# Patient Record
Sex: MALE | Race: WHITE | NOT HISPANIC OR LATINO | Employment: UNEMPLOYED | URBAN - METROPOLITAN AREA
[De-identification: names, ages, dates, MRNs, and addresses within clinical notes are randomized per-mention and may not be internally consistent; named-entity substitution may affect disease eponyms.]

---

## 2017-08-18 ENCOUNTER — HOSPITAL ENCOUNTER (EMERGENCY)
Facility: MEDICAL CENTER | Age: 57
End: 2017-08-18
Attending: EMERGENCY MEDICINE
Payer: MEDICAID

## 2017-08-18 VITALS
OXYGEN SATURATION: 100 % | HEART RATE: 99 BPM | TEMPERATURE: 98.1 F | DIASTOLIC BLOOD PRESSURE: 102 MMHG | SYSTOLIC BLOOD PRESSURE: 144 MMHG | BODY MASS INDEX: 36.08 KG/M2 | RESPIRATION RATE: 20 BRPM | WEIGHT: 272.27 LBS | HEIGHT: 73 IN

## 2017-08-18 DIAGNOSIS — Z72.89 DELIBERATE SELF-CUTTING: ICD-10-CM

## 2017-08-18 DIAGNOSIS — S61.512A WRIST LACERATION, LEFT, INITIAL ENCOUNTER: ICD-10-CM

## 2017-08-18 DIAGNOSIS — R45.851 SUICIDAL IDEATION: ICD-10-CM

## 2017-08-18 DIAGNOSIS — S61.511A LACERATION OF WRIST, RIGHT, INITIAL ENCOUNTER: ICD-10-CM

## 2017-08-18 PROCEDURE — 303747 HCHG EXTRA SUTURE

## 2017-08-18 PROCEDURE — 99284 EMERGENCY DEPT VISIT MOD MDM: CPT

## 2017-08-18 PROCEDURE — 304217 HCHG IRRIGATION SYSTEM

## 2017-08-18 PROCEDURE — 304999 HCHG REPAIR-SIMPLE/INTERMED LEVEL 1

## 2017-08-18 PROCEDURE — 700101 HCHG RX REV CODE 250: Performed by: EMERGENCY MEDICINE

## 2017-08-18 RX ORDER — LIDOCAINE HYDROCHLORIDE 10 MG/ML
20 INJECTION, SOLUTION INFILTRATION; PERINEURAL ONCE
Status: COMPLETED | OUTPATIENT
Start: 2017-08-18 | End: 2017-08-18

## 2017-08-18 RX ADMIN — LIDOCAINE HYDROCHLORIDE 20 ML: 10 INJECTION, SOLUTION INFILTRATION; PERINEURAL at 17:41

## 2017-08-18 NOTE — ED AVS SNAPSHOT
8/18/2017    Herb Angulo  [address] [facility] [address]    Dear Herb:    [facility] wants to ensure your discharge home is safe and you or your loved ones have had all of your questions answered regarding your care after you leave the hospital.    Below is a list of resources and contact information should you have any questions regarding your hospital stay, follow-up instructions, or active medical symptoms.    Questions or Concerns Regarding… Contact   Medical Questions Related to Your Discharge  (7 days a week, 8am-5pm) Contact a Nurse Care Coordinator   [phone]   Medical Questions Not Related to Your Discharge  (24 hours a day / 7 days a week)  Contact the Nurse Health Line   [phone]    Medications or Discharge Instructions Refer to your discharge packet   or contact your [facility] Primary Care Provider   [phone]   Follow-up Appointment(s) Schedule your appointment via [employer] InSite   or contact Scheduling [phone]   Billing Review your statement via [employer] InSite  or contact Billing [phone]   Medical Records Review your records via [employer] InSite   or contact Medical Records [phone]     You may receive a telephone call within two days of discharge. This call is to make certain you understand your discharge instructions and have the opportunity to have any questions answered. You can also easily access your medical information, test results and upcoming appointments via the [employer] InSite free online health management tool. You can learn more and sign up at [email or web address]. For assistance setting up your [employer] In[employer] account, please call [phone].    Once again, we want to ensure your discharge home is safe and that you have a clear understanding of any next steps in your care. If you have any questions or concerns, please do not hesitate to contact us, we are here for you. Thank you for choosing [facility] for your healthcare needs.    Sincerely,    Your [facility] Healthcare Team

## 2017-08-18 NOTE — ED NOTES
Herb Angulo  57 y.o.  Chief Complaint   Patient presents with   • Suicidal Ideation     pt took meth and cut bilat wrists and his neck last night. His neck is a superficial wound, the lacerations to bilat wrists appear suturable.   • T-5000 Lacerations     No active bleeding from wrist wounds, BIB family. Pt with [facility] at this time. Charge RN aware of need for medical bed for lacerations. Also aware of need to be placed on legal hold. Pt is aware of what that is and agreeable. PT denies alcohol. States his drugs of choice are meth and pot

## 2017-08-18 NOTE — ED AVS SNAPSHOT
Home Care Instructions                                                                                                                Herb Angulo   MRN: [id number]    Department:  [facility], Emergency Dept   Date of Visit:  8/18/2017            [facility], Emergency Dept    [address] [place] [id number]    Phone:  [phone]      You were seen by     Jose King M.D.      Your Diagnosis Was     Suicidal ideation     R45.851       Follow-up Information     1. Follow up with [facility], Emergency Dept In 10 days.    Specialty:  Emergency Medicine    Why:  For suture removal, For wound re-check    Contact information    [address] [place] [id number] [phone]        2. Follow up with Pcp Pt States None.    Specialty:  Family Medicine      Medication Information     Review all of your home medications and newly ordered medications with your primary doctor and/or pharmacist as soon as possible. Follow medication instructions as directed by your doctor and/or pharmacist.     Please keep your complete medication list with you and share with your physician. Update the information when medications are discontinued, doses are changed, or new medications (including over-the-counter products) are added; and carry medication information at all times in the event of emergency situations.               Medication List      ASK your doctor about these medications        Instructions    Morning Afternoon Evening Bedtime    hydrocodone-acetaminophen 5-325 MG Tabs per tablet   Commonly known as:  NORCO        Take 1 Tab by mouth every four hours as needed.   Dose:  1 Tab                        ondansetron 4 MG Tabs tablet   Commonly known as:  ZOFRAN        Take 1 Tab by mouth every 8 hours as needed for Nausea/Vomiting.   Dose:  4 mg                        oxycodone-acetaminophen 5-325 MG Tabs   Commonly known as:  PERCOCET        Take 1-2  Tabs by mouth every four hours as needed.   Dose:  1-2 Tab                                Procedures and tests performed during your visit     WOUND IRRIGATION        Discharge Instructions       Laceration Care, Adult  A laceration is a cut that goes through all of the layers of the skin and into the tissue that is right under the skin. Some lacerations heal on their own. Others need to be closed with stitches (sutures), staples, skin adhesive strips, or skin glue. Proper laceration care minimizes the risk of infection and helps the laceration to heal better.  HOW TO CARE FOR YOUR LACERATION  If sutures or staples were used:  · Keep the wound clean and dry.  · If you were given a bandage (dressing), you should change it at least one time per day or as told by your health care provider. You should also change it if it becomes wet or dirty.  · Keep the wound completely dry for the first 24 hours or as told by your health care provider. After that time, you may shower or bathe. However, make sure that the wound is not soaked in water until after the sutures or staples have been removed.  · Clean the wound one time each day or as told by your health care provider:  ¨ Wash the wound with soap and water.  ¨ Rinse the wound with water to remove all soap.  ¨ Pat the wound dry with a clean towel. Do not rub the wound.  · After cleaning the wound, apply a thin layer of antibiotic ointment as told by your health care provider. This will help to prevent infection and keep the dressing from sticking to the wound.  · Have the sutures or staples removed as told by your health care provider.  If skin adhesive strips were used:  · Keep the wound clean and dry.  · If you were given a bandage (dressing), you should change it at least one time per day or as told by your health care provider. You should also change it if it becomes dirty or wet.  · Do not get the skin adhesive strips wet. You may shower or bathe, but be careful to keep  the wound dry.  · If the wound gets wet, pat it dry with a clean towel. Do not rub the wound.  · Skin adhesive strips fall off on their own. You may trim the strips as the wound heals. Do not remove skin adhesive strips that are still stuck to the wound. They will fall off in time.  If skin glue was used:  · Try to keep the wound dry, but you may briefly wet it in the shower or bath. Do not soak the wound in water, such as by swimming.  · After you have showered or bathed, gently pat the wound dry with a clean towel. Do not rub the wound.  · Do not do any activities that will make you sweat heavily until the skin glue has fallen off on its own.  · Do not apply liquid, cream, or ointment medicine to the wound while the skin glue is in place. Using those may loosen the film before the wound has healed.  · If you were given a bandage (dressing), you should change it at least one time per day or as told by your health care provider. You should also change it if it becomes dirty or wet.  · If a dressing is placed over the wound, be careful not to apply tape directly over the skin glue. Doing that may cause the glue to be pulled off before the wound has healed.  · Do not pick at the glue. The skin glue usually remains in place for 5-10 days, then it falls off of the skin.  General Instructions  · Take over-the-counter and prescription medicines only as told by your health care provider.  · If you were prescribed an antibiotic medicine or ointment, take or apply it as told by your doctor. Do not stop using it even if your condition improves.  · To help prevent scarring, make sure to cover your wound with sunscreen whenever you are outside after stitches are removed, after adhesive strips are removed, or when glue remains in place and the wound is healed. Make sure to wear a sunscreen of at least 30 SPF.  · Do not scratch or pick at the wound.  · Keep all follow-up visits as told by your health care provider. This is  important.  · Check your wound every day for signs of infection. Watch for:  ¨ Redness, swelling, or pain.  ¨ Fluid, blood, or pus.  · Raise (elevate) the injured area above the level of your heart while you are sitting or lying down, if possible.  SEEK MEDICAL CARE IF:  · You received a tetanus shot and you have swelling, severe pain, redness, or bleeding at the injection site.  · You have a fever.  · A wound that was closed breaks open.  · You notice a bad smell coming from your wound or your dressing.  · You notice something coming out of the wound, such as wood or glass.  · Your pain is not controlled with medicine.  · You have increased redness, swelling, or pain at the site of your wound.  · You have fluid, blood, or pus coming from your wound.  · You notice a change in the color of your skin near your wound.  · You need to change the dressing frequently due to fluid, blood, or pus draining from the wound.  · You develop a new rash.  · You develop numbness around the wound.  SEEK IMMEDIATE MEDICAL CARE IF:  · You develop severe swelling around the wound.  · Your pain suddenly increases and is severe.  · You develop painful lumps near the wound or on skin that is anywhere on your body.  · You have a red streak going away from your wound.  · The wound is on your hand or foot and you cannot properly move a finger or toe.  · The wound is on your hand or foot and you notice that your fingers or toes look pale or bluish.     This information is not intended to replace advice given to you by your health care provider. Make sure you discuss any questions you have with your health care provider.     Document Released: 12/18/2006 Document Revised: 05/03/2016 Document Reviewed: 12/14/2015  [employer] Interactive Patient Education ©2016 [employer]            Patient Information     Patient Information    Following emergency treatment: all patient requiring follow-up care must return either to a private physician or a  clinic if your condition worsens before you are able to obtain further medical attention, please return to the emergency room.     Billing Information    At [facility], we work to make the billing process streamlined for our patients.  Our Representatives are here to answer any questions you may have regarding your hospital bill.  If you have insurance coverage and have supplied your insurance information to us, we will submit a claim to your insurer on your behalf.  Should you have any questions regarding your bill, we can be reached online or by phone as follows:  Online: You are able pay your bills online or live chat with our representatives about any billing questions you may have. We are here to help Monday - Friday from 8:00am to 7:30pm and 9:00am - 12:00pm on Saturdays.  Please visit [email or web address]  for more information.   Phone:  [phone] or [phone]    Please note that your emergency physician, surgeon, pathologist, radiologist, anesthesiologist, and other specialists are not employed by [facility] and will therefore bill separately for their services.  Please contact them directly for any questions concerning their bills at the numbers below:     Emergency Physician Services:  [phone]  [facility]:  [phone]  [facility]:  [phone]  [facility]:  [phone]    1. Your final bill may vary from the amount quoted upon discharge if all procedures are not complete at that time, or if your doctor has additional procedures of which we are not aware. You will receive an additional bill if you return to the Emergency Department at [facility] for suture removal regardless of the facility of which the sutures were placed.     2. Please arrange for settlement of this account at the emergency registration.    3. All self-pay accounts are due in full at the time of treatment.  If you are unable to meet  this obligation then payment is expected within 4-5 days.     4. If you have had radiology studies (CT, X-ray, Ultrasound, MRI), you have received a preliminary result during your emergency department visit. Please contact the radiology department [phone] to receive a copy of your final result. Please discuss the Final result with your primary physician or with the follow up physician provided.     Crisis Hotline:  [facility] Crisis Hotline:  4-064-YCZDRXB or 1-238.250.4299  [place] Crisis Hotline:    1-313.222.5127 or 203-794-6490         ED Discharge Follow Up Questions    1. In order to provide you with very good care, we would like to follow up with a phone call in the next few days.  May we have your permission to contact you?     YES /  NO    2. What is the best phone number to call you? (       )_____-__________    3. What is the best time to call you?      Morning  /  Afternoon  /  Evening                   Patient Signature:  ____________________________________________________________    Date:  ____________________________________________________________

## 2017-08-18 NOTE — ED NOTES
Dr King has evaluated patient.  [facility] assessment complete.  Attempting to find room with stacey for treatment.

## 2017-08-18 NOTE — ED PROVIDER NOTES
"CHIEF COMPLAINT  Chief Complaint   Patient presents with   • Suicidal Ideation     pt took meth and cut bilat wrists and his neck last night. His neck is a superficial wound, the lacerations to bilat wrists appear suturable.   • T-5000 Lacerations       HPI  Herb Angulo is a 57 y.o. male who presents  After episode of self cutting that occurred yesterday. He cut bilateral wrists with a serrated knife. He regrets  Doing this. He denies active suicidal ideation. He reports that he did this after taking methamphetamines. Has a prior history of substance abuse. He was brought in by family members.    No active bleeding. Normal perfusion to his hands. He denies any irritation in his range of motion of his wrist, hand. Denies any weakness to his  strength. Denies any numbness to his hands. He believes he is up-to-date with his tetanus.    REVIEW OF SYSTEMS  See HPI for further details. All other systems are negative.     PAST MEDICAL HISTORY   has a past medical history of Psychiatric disorder.    SOCIAL HISTORY  Social History     Social History Main Topics   • Smoking status: Never Smoker    • Smokeless tobacco: Not on file   • Alcohol Use: No   • Drug Use: Yes     Special: Inhaled      Comment: daily marijuana use and meth   • Sexual Activity: Not on file       SURGICAL HISTORY   has past surgical history that includes other orthopedic surgery.    CURRENT MEDICATIONS  Home Medications     **Home medications have not yet been reviewed for this encounter**          ALLERGIES  No Known Allergies    PHYSICAL EXAM  VITAL SIGNS: /102 mmHg  Pulse 105  Temp(Src) 36.7 °C (98.1 °F) (Temporal)  Resp 18  Ht 1.854 m (6' 0.99\")  Wt 123.5 kg (272 lb 4.3 oz)  BMI 35.93 kg/m2  SpO2 97%  Pulse ox interpretation: I interpret this pulse ox as normal.  Constitutional: Alert in no apparent distress.  HENT: No signs of trauma, Bilateral external ears normal, Nose normal.   Eyes: Pupils are equal and reactive, " Conjunctiva normal, Non-icteric.   Neck: Normal range of motion, No tenderness, Supple, No stridor.   Lymphatic: No lymphadenopathy noted.   Cardiovascular: Regular rate and rhythm, no murmurs.   Thorax & Lungs: Normal breath sounds, No respiratory distress, No wheezing, No chest tenderness.   Abdomen: Bowel sounds normal, Soft, No tenderness, No masses, No pulsatile masses. No peritoneal signs.  Skin: Warm, Dry, No erythema, No rash.  Approximately 4 cm laceration to his right wrist. There appeared to be approximately 3 of them that are parallel. Approximately 4 cm laceration to the left wrist.  Back: No bony tenderness, No CVA tenderness.   Extremities: Intact distal pulses, No edema, No tenderness, No cyanosis, Brisk capillary refill in all fingers.  Musculoskeletal: Good range of motion in all major joints. No tenderness to palpation or major deformities noted.  The patient is able to move all fingers and wrists in all directions with 5 out of 5 strength. Each finger was tested individually regarding strength on flexion and there is no limitation. Normal flexion and extension at the wrist as well. Normal palpable radial and ulnar pulses at the wrists.  Neurologic: Alert , Normal motor function and gait, Normal sensory function, No focal deficits noted.   Psychiatric:  Denies active suicidal or homicidal ideation       DIAGNOSTIC STUDIES / PROCEDURES    Laceration Repair Procedure Note    Indication: Laceration    Procedure: The patient was placed in the appropriate position and anesthesia around the laceration was obtained by infiltration using 1% Lidocaine without epinephrine. The area was then irrigated with normal saline. The laceration was closed with 4-0 Ethilon using interrupted sutures. A second and third laceration was closed with 4-0 Ethilon using interrupted sutures. The wound area was then dressed with a sterile dressing.      Total repaired wound length: 4 cm, 4 cm, 4 cm     Other Items: Suture  "count: 9    The patient tolerated the procedure well.    Complications: None      COURSE & MEDICAL DECISION MAKING  Pertinent Labs & Imaging studies reviewed. (See chart for details)  57 y.o. M, right hand dominant, presenting after cutting bilateral wrists with a serrated knife after taking methamphetamines. He appears to be remorseful and denies active suicidal or homicidal ideation. He is brought in by concerned family members. Lacerations were cleaned and irrigated. There were then sutured loosely. Risks and benefits of repair were discussed with the patient. Given that it has been a day since the injury occurred, there is increased risk for infection however given the gaping nature of the wound, loose approximation was performed.    No obvious foreign bodies were identified. Clinically, the patient did not have evidence of tendon injury as he has full range of motion to his wrist with normal strength in bilateral hands.    Wounds were then dressed and he was sent to psychiatric facility in the care of his family member. He was volume by [facility] and they believe he is safe to discharge out to this facility directly. The patient is agreeable with the plan. Tolerated the procedure well without any complications.    The patient will return for worsening symptoms or failure of improvement and is stable at the time of discharge. The patient verbalizes understanding in their own words.    /102 mmHg  Pulse 99  Temp(Src) 36.7 °C (98.1 °F) (Temporal)  Resp 20  Ht 1.854 m (6' 0.99\")  Wt 123.5 kg (272 lb 4.3 oz)  BMI 35.93 kg/m2  SpO2 100%    The patient was referred to primary care where they will receive further BP management.      [facility], Emergency Dept  [address] [place] [id number] [phone]  In 10 days  For suture removal, For wound re-check    Pcp Pt States None            FINAL IMPRESSION  1. Suicidal ideation    2. Deliberate self-cutting    3. Wrist " laceration, left, initial encounter    4. Laceration of wrist, right, initial encounter            Electronically signed by: Jose King, 8/18/2017 4:35 PM

## 2017-08-18 NOTE — ED AVS SNAPSHOT
FairShare Access Code: [email or web address]  Expires: 9/17/2017  7:04 PM    Your email address is not on file at [facility].  Email Addresses are required for you to sign up for FairShare, please contact [phone] to verify your personal information and to provide your email address prior to attempting to register for FairShare.    Herb Angulo  [address] [place] [address], [place] [address]    FairShare  A secure, online tool to manage your health information     [facility]’s FairShare® is a secure, online tool that connects you to your personalized health information from the privacy of your home -- day or night - making it very easy for you to manage your healthcare. Once the activation process is completed, you can even access your medical information using the FairShare rosangela, which is available for free in the Apple Rosangela store or Google Play store.     To learn more about FairShare, visit [email or web address]    There are two levels of access available (as shown below):   My Chart Features  [facility] Primary Care Doctor [facility]  Specialists [facility]  Urgent  Care Non-[facility] Primary Care Doctor   Email your healthcare team securely and privately 24/7 X X X    Manage appointments: schedule your next appointment; view details of past/upcoming appointments X      Request prescription refills. X      View recent personal medical records, including lab and immunizations X X X X   View health record, including health history, allergies, medications X X X X   Read reports about your outpatient visits, procedures, consult and ER notes X X X X   See your discharge summary, which is a recap of your hospital and/or ER visit that includes your diagnosis, lab results, and care plan X X  X     How to register for [employer]:  Once your e-mail address has been verified, follow the following steps to sign up for [employer].     1. Go to  [email or web address]  2. Click on the Sign Up Now box, which takes you to the New Member Sign Up  page. You will need to provide the following information:  a. Enter your [facility] Access Code exactly as it appears at the top of this page. (You will not need to use this code after you’ve completed the sign-up process. If you do not sign up before the expiration date, you must request a new code.)   b. Enter your date of birth.   c. Enter your home email address.   d. Click Submit, and follow the next screen’s instructions.  3. Create a [facility] ID. This will be your [facility] login ID and cannot be changed, so think of one that is secure and easy to remember.  4. Create a [facility] password. You can change your password at any time.  5. Enter your Password Reset Question and Answer. This can be used at a later time if you forget your password.   6. Enter your e-mail address. This allows you to receive e-mail notifications when new information is available in [facility].  7. Click Sign Up. You can now view your health information.    For assistance activating your [facility] account, call [phone]

## 2017-08-19 NOTE — DISCHARGE INSTRUCTIONS
Laceration Care, Adult  A laceration is a cut that goes through all of the layers of the skin and into the tissue that is right under the skin. Some lacerations heal on their own. Others need to be closed with stitches (sutures), staples, skin adhesive strips, or skin glue. Proper laceration care minimizes the risk of infection and helps the laceration to heal better.  HOW TO CARE FOR YOUR LACERATION  If sutures or staples were used:  · Keep the wound clean and dry.  · If you were given a bandage (dressing), you should change it at least one time per day or as told by your health care provider. You should also change it if it becomes wet or dirty.  · Keep the wound completely dry for the first 24 hours or as told by your health care provider. After that time, you may shower or bathe. However, make sure that the wound is not soaked in water until after the sutures or staples have been removed.  · Clean the wound one time each day or as told by your health care provider:  ¨ Wash the wound with soap and water.  ¨ Rinse the wound with water to remove all soap.  ¨ Pat the wound dry with a clean towel. Do not rub the wound.  · After cleaning the wound, apply a thin layer of antibiotic ointment as told by your health care provider. This will help to prevent infection and keep the dressing from sticking to the wound.  · Have the sutures or staples removed as told by your health care provider.  If skin adhesive strips were used:  · Keep the wound clean and dry.  · If you were given a bandage (dressing), you should change it at least one time per day or as told by your health care provider. You should also change it if it becomes dirty or wet.  · Do not get the skin adhesive strips wet. You may shower or bathe, but be careful to keep the wound dry.  · If the wound gets wet, pat it dry with a clean towel. Do not rub the wound.  · Skin adhesive strips fall off on their own. You may trim the strips as the wound heals. Do not  remove skin adhesive strips that are still stuck to the wound. They will fall off in time.  If skin glue was used:  · Try to keep the wound dry, but you may briefly wet it in the shower or bath. Do not soak the wound in water, such as by swimming.  · After you have showered or bathed, gently pat the wound dry with a clean towel. Do not rub the wound.  · Do not do any activities that will make you sweat heavily until the skin glue has fallen off on its own.  · Do not apply liquid, cream, or ointment medicine to the wound while the skin glue is in place. Using those may loosen the film before the wound has healed.  · If you were given a bandage (dressing), you should change it at least one time per day or as told by your health care provider. You should also change it if it becomes dirty or wet.  · If a dressing is placed over the wound, be careful not to apply tape directly over the skin glue. Doing that may cause the glue to be pulled off before the wound has healed.  · Do not pick at the glue. The skin glue usually remains in place for 5-10 days, then it falls off of the skin.  General Instructions  · Take over-the-counter and prescription medicines only as told by your health care provider.  · If you were prescribed an antibiotic medicine or ointment, take or apply it as told by your doctor. Do not stop using it even if your condition improves.  · To help prevent scarring, make sure to cover your wound with sunscreen whenever you are outside after stitches are removed, after adhesive strips are removed, or when glue remains in place and the wound is healed. Make sure to wear a sunscreen of at least 30 SPF.  · Do not scratch or pick at the wound.  · Keep all follow-up visits as told by your health care provider. This is important.  · Check your wound every day for signs of infection. Watch for:  ¨ Redness, swelling, or pain.  ¨ Fluid, blood, or pus.  · Raise (elevate) the injured area above the level of your heart  while you are sitting or lying down, if possible.  SEEK MEDICAL CARE IF:  · You received a tetanus shot and you have swelling, severe pain, redness, or bleeding at the injection site.  · You have a fever.  · A wound that was closed breaks open.  · You notice a bad smell coming from your wound or your dressing.  · You notice something coming out of the wound, such as wood or glass.  · Your pain is not controlled with medicine.  · You have increased redness, swelling, or pain at the site of your wound.  · You have fluid, blood, or pus coming from your wound.  · You notice a change in the color of your skin near your wound.  · You need to change the dressing frequently due to fluid, blood, or pus draining from the wound.  · You develop a new rash.  · You develop numbness around the wound.  SEEK IMMEDIATE MEDICAL CARE IF:  · You develop severe swelling around the wound.  · Your pain suddenly increases and is severe.  · You develop painful lumps near the wound or on skin that is anywhere on your body.  · You have a red streak going away from your wound.  · The wound is on your hand or foot and you cannot properly move a finger or toe.  · The wound is on your hand or foot and you notice that your fingers or toes look pale or bluish.     This information is not intended to replace advice given to you by your health care provider. Make sure you discuss any questions you have with your health care provider.     Document Released: 12/18/2006 Document Revised: 05/03/2016 Document Reviewed: 12/14/2015  [employer] Patient Education ©2016 [employer]
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
## 2017-08-19 NOTE — ED NOTES
Discharge instructions given to patient; patient verbalized understanding. VS WNL for baseline. Pt left ED w/ steady gait headed to [facility].

## 2017-08-26 ENCOUNTER — HOSPITAL ENCOUNTER (INPATIENT)
Facility: MEDICAL CENTER | Age: 57
LOS: 2 days | DRG: 603 | End: 2017-08-29
Attending: EMERGENCY MEDICINE | Admitting: INTERNAL MEDICINE
Payer: MEDICAID

## 2017-08-26 DIAGNOSIS — L02.414 ABSCESS OF FOREARM, LEFT: ICD-10-CM

## 2017-08-26 LAB
ALBUMIN SERPL BCP-MCNC: 3.9 G/DL (ref 3.2–4.9)
ALBUMIN/GLOB SERPL: 1.3 G/DL
ALP SERPL-CCNC: 76 U/L (ref 30–99)
ALT SERPL-CCNC: 16 U/L (ref 2–50)
ANION GAP SERPL CALC-SCNC: 10 MMOL/L (ref 0–11.9)
AST SERPL-CCNC: 16 U/L (ref 12–45)
BASOPHILS # BLD AUTO: 0.6 % (ref 0–1.8)
BASOPHILS # BLD: 0.04 K/UL (ref 0–0.12)
BILIRUB SERPL-MCNC: 0.4 MG/DL (ref 0.1–1.5)
BUN SERPL-MCNC: 11 MG/DL (ref 8–22)
CALCIUM SERPL-MCNC: 9 MG/DL (ref 8.5–10.5)
CHLORIDE SERPL-SCNC: 107 MMOL/L (ref 96–112)
CO2 SERPL-SCNC: 22 MMOL/L (ref 20–33)
CREAT SERPL-MCNC: 1.15 MG/DL (ref 0.5–1.4)
EOSINOPHIL # BLD AUTO: 0.12 K/UL (ref 0–0.51)
EOSINOPHIL NFR BLD: 1.7 % (ref 0–6.9)
ERYTHROCYTE [DISTWIDTH] IN BLOOD BY AUTOMATED COUNT: 39.6 FL (ref 35.9–50)
GFR SERPL CREATININE-BSD FRML MDRD: >60 ML/MIN/1.73 M 2
GLOBULIN SER CALC-MCNC: 3 G/DL (ref 1.9–3.5)
GLUCOSE SERPL-MCNC: 118 MG/DL (ref 65–99)
HCT VFR BLD AUTO: 40.5 % (ref 42–52)
HGB BLD-MCNC: 13.7 G/DL (ref 14–18)
IMM GRANULOCYTES # BLD AUTO: 0.02 K/UL (ref 0–0.11)
IMM GRANULOCYTES NFR BLD AUTO: 0.3 % (ref 0–0.9)
LYMPHOCYTES # BLD AUTO: 1.98 K/UL (ref 1–4.8)
LYMPHOCYTES NFR BLD: 27.7 % (ref 22–41)
MCH RBC QN AUTO: 30.3 PG (ref 27–33)
MCHC RBC AUTO-ENTMCNC: 33.8 G/DL (ref 33.7–35.3)
MCV RBC AUTO: 89.6 FL (ref 81.4–97.8)
MONOCYTES # BLD AUTO: 0.65 K/UL (ref 0–0.85)
MONOCYTES NFR BLD AUTO: 9.1 % (ref 0–13.4)
NEUTROPHILS # BLD AUTO: 4.35 K/UL (ref 1.82–7.42)
NEUTROPHILS NFR BLD: 60.6 % (ref 44–72)
NRBC # BLD AUTO: 0 K/UL
NRBC BLD AUTO-RTO: 0 /100 WBC
PLATELET # BLD AUTO: 290 K/UL (ref 164–446)
PMV BLD AUTO: 9.6 FL (ref 9–12.9)
POTASSIUM SERPL-SCNC: 3.8 MMOL/L (ref 3.6–5.5)
PROT SERPL-MCNC: 6.9 G/DL (ref 6–8.2)
RBC # BLD AUTO: 4.52 M/UL (ref 4.7–6.1)
SODIUM SERPL-SCNC: 139 MMOL/L (ref 135–145)
WBC # BLD AUTO: 7.2 K/UL (ref 4.8–10.8)

## 2017-08-26 PROCEDURE — 94760 N-INVAS EAR/PLS OXIMETRY 1: CPT

## 2017-08-26 PROCEDURE — 87186 SC STD MICRODIL/AGAR DIL: CPT

## 2017-08-26 PROCEDURE — 99285 EMERGENCY DEPT VISIT HI MDM: CPT

## 2017-08-26 PROCEDURE — 87205 SMEAR GRAM STAIN: CPT

## 2017-08-26 PROCEDURE — 80053 COMPREHEN METABOLIC PANEL: CPT

## 2017-08-26 PROCEDURE — 36415 COLL VENOUS BLD VENIPUNCTURE: CPT

## 2017-08-26 PROCEDURE — 96365 THER/PROPH/DIAG IV INF INIT: CPT

## 2017-08-26 PROCEDURE — 87077 CULTURE AEROBIC IDENTIFY: CPT

## 2017-08-26 PROCEDURE — 85025 COMPLETE CBC W/AUTO DIFF WBC: CPT

## 2017-08-26 PROCEDURE — 87070 CULTURE OTHR SPECIMN AEROBIC: CPT

## 2017-08-26 PROCEDURE — 700101 HCHG RX REV CODE 250

## 2017-08-26 RX ORDER — CLINDAMYCIN PHOSPHATE 600 MG/50ML
INJECTION, SOLUTION INTRAVENOUS
Status: COMPLETED
Start: 2017-08-26 | End: 2017-08-26

## 2017-08-26 RX ORDER — CLINDAMYCIN PHOSPHATE 600 MG/50ML
600 INJECTION, SOLUTION INTRAVENOUS ONCE
Status: COMPLETED | OUTPATIENT
Start: 2017-08-26 | End: 2017-08-26

## 2017-08-26 RX ADMIN — CLINDAMYCIN PHOSPHATE 600 MG: 600 INJECTION, SOLUTION INTRAVENOUS at 23:06

## 2017-08-26 RX ADMIN — CLINDAMYCIN IN 5 PERCENT DEXTROSE 600 MG: 12 INJECTION, SOLUTION INTRAVENOUS at 23:06

## 2017-08-27 ENCOUNTER — RESOLUTE PROFESSIONAL BILLING HOSPITAL PROF FEE (OUTPATIENT)
Dept: HOSPITALIST | Facility: MEDICAL CENTER | Age: 57
End: 2017-08-27

## 2017-08-27 ENCOUNTER — APPOINTMENT (OUTPATIENT)
Dept: RADIOLOGY | Facility: MEDICAL CENTER | Age: 57
DRG: 603 | End: 2017-08-27
Attending: INTERNAL MEDICINE
Payer: MEDICAID

## 2017-08-27 PROBLEM — L03.90 CELLULITIS: Status: ACTIVE | Noted: 2017-08-27

## 2017-08-27 PROBLEM — F32.A DEPRESSION: Status: ACTIVE | Noted: 2017-08-27

## 2017-08-27 PROBLEM — L08.9 WOUND INFECTION: Status: ACTIVE | Noted: 2017-08-27

## 2017-08-27 PROBLEM — T14.8XXA WOUND INFECTION: Status: ACTIVE | Noted: 2017-08-27

## 2017-08-27 LAB
ALBUMIN SERPL BCP-MCNC: 3.6 G/DL (ref 3.2–4.9)
ALBUMIN/GLOB SERPL: 1.3 G/DL
ALP SERPL-CCNC: 74 U/L (ref 30–99)
ALT SERPL-CCNC: 15 U/L (ref 2–50)
AMPHET UR QL SCN: NEGATIVE
ANION GAP SERPL CALC-SCNC: 6 MMOL/L (ref 0–11.9)
AST SERPL-CCNC: 14 U/L (ref 12–45)
BARBITURATES UR QL SCN: NEGATIVE
BASOPHILS # BLD AUTO: 0.4 % (ref 0–1.8)
BASOPHILS # BLD: 0.02 K/UL (ref 0–0.12)
BENZODIAZ UR QL SCN: NEGATIVE
BILIRUB SERPL-MCNC: 0.5 MG/DL (ref 0.1–1.5)
BUN SERPL-MCNC: 10 MG/DL (ref 8–22)
BZE UR QL SCN: NEGATIVE
CALCIUM SERPL-MCNC: 8.5 MG/DL (ref 8.5–10.5)
CANNABINOIDS UR QL SCN: POSITIVE
CHLORIDE SERPL-SCNC: 107 MMOL/L (ref 96–112)
CO2 SERPL-SCNC: 24 MMOL/L (ref 20–33)
CREAT SERPL-MCNC: 1.05 MG/DL (ref 0.5–1.4)
EOSINOPHIL # BLD AUTO: 0.04 K/UL (ref 0–0.51)
EOSINOPHIL NFR BLD: 0.7 % (ref 0–6.9)
ERYTHROCYTE [DISTWIDTH] IN BLOOD BY AUTOMATED COUNT: 40.5 FL (ref 35.9–50)
GFR SERPL CREATININE-BSD FRML MDRD: >60 ML/MIN/1.73 M 2
GLOBULIN SER CALC-MCNC: 2.8 G/DL (ref 1.9–3.5)
GLUCOSE SERPL-MCNC: 106 MG/DL (ref 65–99)
GRAM STN SPEC: NORMAL
HCT VFR BLD AUTO: 37.5 % (ref 42–52)
HGB BLD-MCNC: 13 G/DL (ref 14–18)
IMM GRANULOCYTES # BLD AUTO: 0.01 K/UL (ref 0–0.11)
IMM GRANULOCYTES NFR BLD AUTO: 0.2 % (ref 0–0.9)
LYMPHOCYTES # BLD AUTO: 1.2 K/UL (ref 1–4.8)
LYMPHOCYTES NFR BLD: 22.4 % (ref 22–41)
MAGNESIUM SERPL-MCNC: 2.3 MG/DL (ref 1.5–2.5)
MCH RBC QN AUTO: 31.6 PG (ref 27–33)
MCHC RBC AUTO-ENTMCNC: 34.7 G/DL (ref 33.7–35.3)
MCV RBC AUTO: 91.2 FL (ref 81.4–97.8)
METHADONE UR QL SCN: NEGATIVE
MONOCYTES # BLD AUTO: 0.37 K/UL (ref 0–0.85)
MONOCYTES NFR BLD AUTO: 6.9 % (ref 0–13.4)
NEUTROPHILS # BLD AUTO: 3.72 K/UL (ref 1.82–7.42)
NEUTROPHILS NFR BLD: 69.4 % (ref 44–72)
NRBC # BLD AUTO: 0 K/UL
NRBC BLD AUTO-RTO: 0 /100 WBC
OPIATES UR QL SCN: NEGATIVE
OXYCODONE UR QL SCN: NEGATIVE
PCP UR QL SCN: NEGATIVE
PLATELET # BLD AUTO: 230 K/UL (ref 164–446)
PMV BLD AUTO: 9.4 FL (ref 9–12.9)
POTASSIUM SERPL-SCNC: 3.9 MMOL/L (ref 3.6–5.5)
PROPOXYPH UR QL SCN: NEGATIVE
PROT SERPL-MCNC: 6.4 G/DL (ref 6–8.2)
RBC # BLD AUTO: 4.11 M/UL (ref 4.7–6.1)
SIGNIFICANT IND 70042: NORMAL
SITE SITE: NORMAL
SODIUM SERPL-SCNC: 137 MMOL/L (ref 135–145)
SOURCE SOURCE: NORMAL
WBC # BLD AUTO: 5.4 K/UL (ref 4.8–10.8)

## 2017-08-27 PROCEDURE — 700102 HCHG RX REV CODE 250 W/ 637 OVERRIDE(OP): Performed by: STUDENT IN AN ORGANIZED HEALTH CARE EDUCATION/TRAINING PROGRAM

## 2017-08-27 PROCEDURE — 700111 HCHG RX REV CODE 636 W/ 250 OVERRIDE (IP): Performed by: INTERNAL MEDICINE

## 2017-08-27 PROCEDURE — 700105 HCHG RX REV CODE 258: Performed by: STUDENT IN AN ORGANIZED HEALTH CARE EDUCATION/TRAINING PROGRAM

## 2017-08-27 PROCEDURE — 36415 COLL VENOUS BLD VENIPUNCTURE: CPT

## 2017-08-27 PROCEDURE — 99222 1ST HOSP IP/OBS MODERATE 55: CPT | Mod: GC | Performed by: INTERNAL MEDICINE

## 2017-08-27 PROCEDURE — 80053 COMPREHEN METABOLIC PANEL: CPT

## 2017-08-27 PROCEDURE — 76882 US LMTD JT/FCL EVL NVASC XTR: CPT | Mod: LT

## 2017-08-27 PROCEDURE — 83735 ASSAY OF MAGNESIUM: CPT

## 2017-08-27 PROCEDURE — 700101 HCHG RX REV CODE 250: Performed by: STUDENT IN AN ORGANIZED HEALTH CARE EDUCATION/TRAINING PROGRAM

## 2017-08-27 PROCEDURE — 87040 BLOOD CULTURE FOR BACTERIA: CPT | Mod: 91

## 2017-08-27 PROCEDURE — 700111 HCHG RX REV CODE 636 W/ 250 OVERRIDE (IP): Performed by: STUDENT IN AN ORGANIZED HEALTH CARE EDUCATION/TRAINING PROGRAM

## 2017-08-27 PROCEDURE — 3E0234Z INTRODUCTION OF SERUM, TOXOID AND VACCINE INTO MUSCLE, PERCUTANEOUS APPROACH: ICD-10-PCS | Performed by: INTERNAL MEDICINE

## 2017-08-27 PROCEDURE — 90471 IMMUNIZATION ADMIN: CPT

## 2017-08-27 PROCEDURE — 80307 DRUG TEST PRSMV CHEM ANLYZR: CPT

## 2017-08-27 PROCEDURE — A9270 NON-COVERED ITEM OR SERVICE: HCPCS | Performed by: STUDENT IN AN ORGANIZED HEALTH CARE EDUCATION/TRAINING PROGRAM

## 2017-08-27 PROCEDURE — 90715 TDAP VACCINE 7 YRS/> IM: CPT | Performed by: INTERNAL MEDICINE

## 2017-08-27 PROCEDURE — 770006 HCHG ROOM/CARE - MED/SURG/GYN SEMI*

## 2017-08-27 PROCEDURE — 85025 COMPLETE CBC W/AUTO DIFF WBC: CPT

## 2017-08-27 RX ORDER — CLINDAMYCIN PHOSPHATE 600 MG/50ML
600 INJECTION, SOLUTION INTRAVENOUS EVERY 8 HOURS
Status: DISCONTINUED | OUTPATIENT
Start: 2017-08-27 | End: 2017-08-29 | Stop reason: HOSPADM

## 2017-08-27 RX ORDER — AMOXICILLIN 250 MG
2 CAPSULE ORAL 2 TIMES DAILY
Status: DISCONTINUED | OUTPATIENT
Start: 2017-08-27 | End: 2017-08-29 | Stop reason: HOSPADM

## 2017-08-27 RX ORDER — BISACODYL 10 MG
10 SUPPOSITORY, RECTAL RECTAL
Status: DISCONTINUED | OUTPATIENT
Start: 2017-08-27 | End: 2017-08-29 | Stop reason: HOSPADM

## 2017-08-27 RX ORDER — ACETAMINOPHEN 325 MG/1
650 TABLET ORAL EVERY 6 HOURS PRN
Status: DISCONTINUED | OUTPATIENT
Start: 2017-08-27 | End: 2017-08-29 | Stop reason: HOSPADM

## 2017-08-27 RX ORDER — POLYETHYLENE GLYCOL 3350 17 G/17G
1 POWDER, FOR SOLUTION ORAL
Status: DISCONTINUED | OUTPATIENT
Start: 2017-08-27 | End: 2017-08-29 | Stop reason: HOSPADM

## 2017-08-27 RX ORDER — LABETALOL HYDROCHLORIDE 5 MG/ML
10 INJECTION, SOLUTION INTRAVENOUS EVERY 4 HOURS PRN
Status: DISCONTINUED | OUTPATIENT
Start: 2017-08-27 | End: 2017-08-29 | Stop reason: HOSPADM

## 2017-08-27 RX ORDER — SODIUM CHLORIDE 9 MG/ML
INJECTION, SOLUTION INTRAVENOUS CONTINUOUS
Status: DISCONTINUED | OUTPATIENT
Start: 2017-08-27 | End: 2017-08-29 | Stop reason: HOSPADM

## 2017-08-27 RX ADMIN — CLOSTRIDIUM TETANI TOXOID ANTIGEN (FORMALDEHYDE INACTIVATED), CORYNEBACTERIUM DIPHTHERIAE TOXOID ANTIGEN (FORMALDEHYDE INACTIVATED), BORDETELLA PERTUSSIS TOXOID ANTIGEN (GLUTARALDEHYDE INACTIVATED), BORDETELLA PERTUSSIS FILAMENTOUS HEMAGGLUTININ ANTIGEN (FORMALDEHYDE INACTIVATED), BORDETELLA PERTUSSIS PERTACTIN ANTIGEN, AND BORDETELLA PERTUSSIS FIMBRIAE 2/3 ANTIGEN 0.5 ML: 5; 2; 2.5; 5; 3; 5 INJECTION, SUSPENSION INTRAMUSCULAR at 02:31

## 2017-08-27 RX ADMIN — CLINDAMYCIN IN 5 PERCENT DEXTROSE 600 MG: 12 INJECTION, SOLUTION INTRAVENOUS at 13:56

## 2017-08-27 RX ADMIN — CLINDAMYCIN IN 5 PERCENT DEXTROSE 600 MG: 12 INJECTION, SOLUTION INTRAVENOUS at 20:19

## 2017-08-27 RX ADMIN — SERTRALINE 50 MG: 50 TABLET, FILM COATED ORAL at 20:19

## 2017-08-27 RX ADMIN — CLINDAMYCIN IN 5 PERCENT DEXTROSE 600 MG: 12 INJECTION, SOLUTION INTRAVENOUS at 06:25

## 2017-08-27 RX ADMIN — ENOXAPARIN SODIUM 40 MG: 100 INJECTION SUBCUTANEOUS at 08:25

## 2017-08-27 RX ADMIN — SODIUM CHLORIDE: 9 INJECTION, SOLUTION INTRAVENOUS at 15:52

## 2017-08-27 RX ADMIN — SODIUM CHLORIDE: 9 INJECTION, SOLUTION INTRAVENOUS at 03:47

## 2017-08-27 ASSESSMENT — ENCOUNTER SYMPTOMS
CHILLS: 0
VOMITING: 0
PALPITATIONS: 0
FOCAL WEAKNESS: 0
HEARTBURN: 0
BLURRED VISION: 0
DIARRHEA: 0
HEADACHES: 0
HEMOPTYSIS: 0
WEAKNESS: 0
DOUBLE VISION: 0
COUGH: 0
BACK PAIN: 0
SPUTUM PRODUCTION: 0
DEPRESSION: 0
MYALGIAS: 0
SHORTNESS OF BREATH: 0
DEPRESSION: 1
NAUSEA: 0
ABDOMINAL PAIN: 0
FEVER: 0
HEADACHES: 1
DIZZINESS: 0
FALLS: 0
TINGLING: 0
CONSTIPATION: 0

## 2017-08-27 ASSESSMENT — PATIENT HEALTH QUESTIONNAIRE - PHQ9
2. FEELING DOWN, DEPRESSED, IRRITABLE, OR HOPELESS: SEVERAL DAYS
SUM OF ALL RESPONSES TO PHQ QUESTIONS 1-9: 1
SUM OF ALL RESPONSES TO PHQ9 QUESTIONS 1 AND 2: 1
1. LITTLE INTEREST OR PLEASURE IN DOING THINGS: NOT AT ALL

## 2017-08-27 ASSESSMENT — COPD QUESTIONNAIRES
HAVE YOU SMOKED AT LEAST 100 CIGARETTES IN YOUR ENTIRE LIFE: YES
DO YOU EVER COUGH UP ANY MUCUS OR PHLEGM?: YES, A FEW DAYS A WEEK OR MONTH
COPD SCREENING SCORE: 6
DURING THE PAST 4 WEEKS HOW MUCH DID YOU FEEL SHORT OF BREATH: SOME OF THE TIME

## 2017-08-27 ASSESSMENT — LIFESTYLE VARIABLES
DO YOU DRINK ALCOHOL: NO
EVER_SMOKED: NEVER
SUBSTANCE_ABUSE: 0
EVER_SMOKED: NEVER

## 2017-08-27 ASSESSMENT — PAIN SCALES - GENERAL
PAINLEVEL_OUTOF10: 0

## 2017-08-27 NOTE — ASSESSMENT & PLAN NOTE
Left wrist cellulitis with purulent drainage s/p bilateral wrist laceration. TDAP shot received 8/27.   - No leukocytosis or fevers.  Plan:  -Wound Cx obtained: pen-resistant staph aureus  -BCx 8/27, NGTD  -IV antibiotics with clindamycin, currently day 2  -IV fluid  -Left wrist U/S showed findings c/w abscess; pt scheduled with surgery for I&D today

## 2017-08-27 NOTE — ASSESSMENT & PLAN NOTE
H/o meth use (inhaled) and cannabis use. Last drug use 8/20. UDS positive for cannabinoid  -Counseling and encouragement provided.

## 2017-08-27 NOTE — PROGRESS NOTES
"       Internal Medicine Interval Note    Name Herb Angulo     1960   Age/Sex 57 y.o. male   MRN [id number]   Code Status Full     After 5PM or if no immediate response to page, please call for cross-coverage  Attending/Team: Dr. Bernard/Shawn See Patient List for primary contact information  Call [phone] to page    1st Call - Day Intern (R1):  2nd Call - Day Sr. Resident (R2/R3): Dr. Blake         Reason for interval visit  (Principal Problem)   Cellulitis    Interval Problem Daily Status Update  (24 hours)   Patient does not have any complaints this am.  Denies SI/HI  Will initiate him on zoloft, will need outpatient referral to psychiatry. Awaiting US results for possible abscess.   Will obtain blood cx,     Review of Systems   Constitutional: Negative for chills, fever and malaise/fatigue.   HENT: Negative for hearing loss.    Eyes: Negative for blurred vision and double vision.   Respiratory: Negative for cough, hemoptysis and shortness of breath.    Cardiovascular: Negative for chest pain and palpitations.   Gastrointestinal: Negative for abdominal pain, heartburn, nausea and vomiting.   Genitourinary: Negative for dysuria and urgency.   Musculoskeletal: Negative for falls and myalgias.   Skin: Negative for itching and rash.   Neurological: Negative for dizziness, tingling, weakness and headaches.   Psychiatric/Behavioral: Positive for depression. Negative for substance abuse and suicidal ideas.       Consultants/Specialty  None    Disposition  Inpatient pending further medical management.     Quality Measures  Quality-Core Measures  No goldstein  Abx: clindamycin  DVt ppx: lovenox      Physical Exam       Vitals:    17 0056 17 0120 17 0326 17 0710   BP:   145/95 151/96   Pulse: 84 80 79 75   Resp: 16   17   Temp:   36.4 °C (97.5 °F) 36.3 °C (97.3 °F)   SpO2: 96% 97% 93% 95%   Weight:       Height:   1.854 m (6' 1\")      Body mass index is 35.46 kg/m². Weight: " 121.9 kg (268 lb 11.9 oz)  Oxygen Therapy:  Pulse Oximetry: 95 %, O2 (LPM): 0, O2 Delivery: None (Room Air)    Physical Exam   Constitutional: He is oriented to person, place, and time. No distress.   HENT:   Head: Normocephalic and atraumatic.   Eyes: EOM are normal. Pupils are equal, round, and reactive to light. No scleral icterus.   Neck: Normal range of motion. Neck supple. No JVD present.   Cardiovascular: Normal rate and regular rhythm.    Pulmonary/Chest: Effort normal and breath sounds normal.   Abdominal: Soft. Bowel sounds are normal. He exhibits no distension. There is no tenderness.   Musculoskeletal: He exhibits no edema or tenderness.   Lymphadenopathy:     He has no cervical adenopathy.   Neurological: He is alert and oriented to person, place, and time.   Skin: Skin is warm and dry. He is not diaphoretic.   About 5 cm laceration bilaterally on his wrist. R hand healing with clean eschar and sutures in place. L hand with purulent thick yellow white discharge. Please refer to clinical images for further reference.          Lab Data Review:         8/27/2017  10:21 AM    Recent Labs      08/26/17 2210 08/27/17   0819   SODIUM  139  137   POTASSIUM  3.8  3.9   CHLORIDE  107  107   CO2  22  24   BUN  11  10   CREATININE  1.15  1.05   MAGNESIUM   --   2.3   CALCIUM  9.0  8.5       Recent Labs      08/26/17 2210 08/27/17   0819   ALTSGPT  16  15   ASTSGOT  16  14   ALKPHOSPHAT  76  74   TBILIRUBIN  0.4  0.5   GLUCOSE  118*  106*       Recent Labs      08/26/17 2210 08/27/17   0819   RBC  4.52*  4.11*   HEMOGLOBIN  13.7*  13.0*   HEMATOCRIT  40.5*  37.5*   PLATELETCT  290  230       Recent Labs      08/26/17 2210 08/27/17   0819   WBC  7.2  5.4   NEUTSPOLYS  60.60  69.40   LYMPHOCYTES  27.70  22.40   MONOCYTES  9.10  6.90   EOSINOPHILS  1.70  0.70   BASOPHILS  0.60  0.40   ASTSGOT  16  14   ALTSGPT  16  15   ALKPHOSPHAT  76  74   TBILIRUBIN  0.4  0.5           Assessment/Plan     * Cellulitis    Assessment & Plan    Cellulitis with purulent drainage s/p bilateral laceration of his wrist. TDAP shot received 8/27.   Plan:  -Wound cx obtained: pending growth  -Blood cx obtained  -IV antibiotics with clindamycin  -IV fluid  -Left wrist Ultrasound to rule out abscess.            Depression   Assessment & Plan    Hx of depression with suicidal attempt on 8/18. Denies any SI or HI. Contributory factors homelessness and drug abuse(meth and cannabinoid). Sober since 8/20. UDS only positive for cannabinoids.   -will initiate him on zoloft 50mg  -will need outpatient referral to psychiatry  -currently living in soberhouse for rehab.         Substance abuse- (present on admission)   Assessment & Plan    Last drug use 8/20. UDS positive for cannabinoid  -Counseling and encouragement provided.

## 2017-08-27 NOTE — PROGRESS NOTES
Arrived from ED via gurney around 0330.  Denied any discomfort.  Admission profile completed.  Pictures taken on both wrists.  Left wrist wound drainage noted.  Adhesive foam dressing applied.  Oriented to new environments and communication board updated.  Urine specimen sent down to lab.
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
## 2017-08-27 NOTE — ED PROVIDER NOTES
"ED Provider Note    CHIEF COMPLAINT  Chief Complaint   Patient presents with   • Wound Infection     pt with abscess to lt wrist lac sutures present large amount of purulent drainage and swelling to suture site, redness swelling to site, pt also has sutures to rt wrist lacs x2 with redness and swelling present       HPI  Herb Angulo is a 57 y.o. male who presentsTo the emergency department with pain and swelling to the left forearm and anterior component. The patient had a self-inflicted incision to his left wrist. The patient states this occurred approximately 8 days ago. The patient's noticed increased pain and swelling to the incision site. He does not have any associated fevers or vomiting. He does not have any loss of function to the left hand.    REVIEW OF SYSTEMS  See HPI for further details. All other systems are negative.     PAST MEDICAL HISTORY  Past Medical History:   Diagnosis Date   • Psychiatric disorder     depression and bipolar        SOCIAL HISTORY  Social History     Social History   • Marital status: Single     Spouse name: N/A   • Number of children: N/A   • Years of education: N/A     Social History Main Topics   • Smoking status: Never Smoker   • Smokeless tobacco: Not on file   • Alcohol use No   • Drug use:      Types: Inhaled      Comment: daily marijuana use and meth   • Sexual activity: Not on file     Other Topics Concern   • Not on file     Social History Narrative    ** Merged History Encounter **                PHYSICAL EXAM  VITAL SIGNS: /90   Pulse 95   Temp 36.2 °C (97.1 °F)   Resp 16   Ht 1.854 m (6' 1\")   Wt 121.9 kg (268 lb 11.9 oz)   SpO2 97%   BMI 35.46 kg/m²   Constitutional: Well developed, Well nourished, No acute distress, Non-toxic appearance.   HENT: Normocephalic, Atraumatic, tympanic membranes are intact and nonerythematous bilaterally, Oropharynx moist without exudates or erythema, Nose normal.   Eyes: PERRLA, EOMI, Conjunctiva normal.  Neck: " Supple without meningismus  Lymphatic: No lymphadenopathy noted.   Cardiovascular: Normal heart rate, Normal rhythm, No murmurs, No rubs, No gallops.   Thorax & Lungs: Normal breath sounds, No respiratory distress, No wheezing, No chest tenderness.   Abdomen: Bowel sounds normal, Soft, No tenderness, no rebound, no guarding, no distention, No masses, No pulsatile masses.   Skin:The patient has an approximate 4 centimeter area of induration surrounding the incision on the left anterior wrist. The patient does have purulent drainage. The sutures are in place.   Back: No tenderness, No CVA tenderness.   Extremities: The abscess described above otherwise Atraumatic with symmetric distal pulses, No edema, No tenderness, No cyanosis, No clubbing.   Neurologic: Alert & oriented x 3, cranial nerves II through XII are intact, Normal motor function, Normal sensory function, No focal deficits noted.   Psychiatric: Affect normal, Judgment normal, Mood normal.       PROCEDURES suture removal and irrigation  The sutures removed without difficulty to the left anterior aspect of the wrist. The patient does have purulent drainage and the cavity was irrigated by the nursing staff after cleansed by myself. Sterile strips were applied for closure.    COURSE & MEDICAL DECISION MAKING  Pertinent Labs & Imaging studies reviewed. (See chart for details)  This a 57-year-old male who presents emergency department with an abscess from a recent incision. He does have purulent drainage and is currently in a sober home living facility therefore will admit the patient to the hospital for IV antibiotics and further observation. At this time is not showing any evidence of a systemic infection. He seemed stable.    FINAL IMPRESSION  1. Abscess to the left forearm  2. Alcohol abuse       Disposition  The patient will be admitted in stable condition    Electronically signed by: Alexis Ortiz, 8/26/2017 9:50 PM

## 2017-08-27 NOTE — ED NOTES
(B) wrist lacerations noted,  (R) with dried drainage and sutures noted,  Some redness.  (L) with large amt of purulent drainage,  Lac not approximated,  Some sutures noted, redness and swelling noted.  Pt denies pain, fevers or chills.

## 2017-08-27 NOTE — SENIOR ADMIT NOTE
Mr. Angulo is a 57 y.o. male with PMHx of marijuana smoking, methamphetamine snoring, depression  presented with left wrist swelling. Pain. erythema x 3 days. ? Fluctuance. Pulse intact. No fever, chills.  He felt hopeless one week ago and he used kitchen knife to cut both of his wrists. He came to ED previously to get them repaired.  Hx of homeless now lives in a sober living facility. Patient states that his mood got much better after he has a place to stay.     Assessment and Plan:  # Cellulitis, left wrist wound, need to rule out abscess: IV clindamycin, wound care, irrigation, IV fluid. Pain management. TDAP x1 as patient did not have tetanus shot >10 years. Left wrist U/S to rule out abscess.  # hx of depression: no SI/HI. Mood fine.   # Methamphetamine abuse, marijuana smoker: education and counseling on cessation of methamphetamine and marijuana.     Lovenox for DVT prophylaxis  Full code    For further details, please refer to Dr. Ruano's H&P.

## 2017-08-27 NOTE — ED NOTES
Pt to [facility], updated on POC.  Denies needs at this time.  Call light in reach.    Report to AMAURI Peterson

## 2017-08-27 NOTE — ASSESSMENT & PLAN NOTE
Hx of depression with suicidal attempt on 8/18. Denies any current SI or HI. Contributory factors homelessness and drug abuse(meth and cannabinoid). Sober since 8/20. UDS only positive for cannabinoids.   -zoloft 50mg initiated 8/27  -will need outpatient referral to psychiatry  -currently living in soberhouse for rehab.

## 2017-08-27 NOTE — ED NOTES
.  Chief Complaint   Patient presents with   • Wound Infection     pt with abscess to lt wrist lac sutures present large amount of purulent drainage and swelling to suture site, redness swelling to site, pt also has sutures to rt wrist lacs x2 with redness and swelling present     .Pt Informed regarding triage process and verbalized understanding to inform triage tech or RN for any changes in condition.  Placed in lobby.

## 2017-08-27 NOTE — CARE PLAN
Problem: Communication  Goal: The ability to communicate needs accurately and effectively will improve  Outcome: PROGRESSING AS EXPECTED  Patient able to make all needs known.     Problem: Infection  Goal: Will remain free from infection  Outcome: PROGRESSING AS EXPECTED  Receiving antibiotics for wrist laceration, patient afebrile.

## 2017-08-27 NOTE — PROGRESS NOTES
2 RN skin assessment completed.  On other skin issues other than bilateral wrist wounds.  Right wrist dry and scabbed.  Left wrist wound opened and draining.  Pictures taken and uploaded.

## 2017-08-28 LAB
BACTERIA WND AEROBE CULT: ABNORMAL
BACTERIA WND AEROBE CULT: ABNORMAL
GRAM STN SPEC: ABNORMAL
SIGNIFICANT IND 70042: ABNORMAL
SITE SITE: ABNORMAL
SOURCE SOURCE: ABNORMAL

## 2017-08-28 PROCEDURE — 700101 HCHG RX REV CODE 250: Performed by: STUDENT IN AN ORGANIZED HEALTH CARE EDUCATION/TRAINING PROGRAM

## 2017-08-28 PROCEDURE — 770006 HCHG ROOM/CARE - MED/SURG/GYN SEMI*

## 2017-08-28 PROCEDURE — 160035 HCHG PACU - 1ST 60 MINS PHASE I: Performed by: SURGERY

## 2017-08-28 PROCEDURE — A9270 NON-COVERED ITEM OR SERVICE: HCPCS | Performed by: STUDENT IN AN ORGANIZED HEALTH CARE EDUCATION/TRAINING PROGRAM

## 2017-08-28 PROCEDURE — 160002 HCHG RECOVERY MINUTES (STAT): Performed by: SURGERY

## 2017-08-28 PROCEDURE — 700102 HCHG RX REV CODE 250 W/ 637 OVERRIDE(OP): Performed by: STUDENT IN AN ORGANIZED HEALTH CARE EDUCATION/TRAINING PROGRAM

## 2017-08-28 PROCEDURE — 501445 HCHG STAPLER, SKIN DISP: Performed by: SURGERY

## 2017-08-28 PROCEDURE — 160009 HCHG ANES TIME/MIN: Performed by: SURGERY

## 2017-08-28 PROCEDURE — 87070 CULTURE OTHR SPECIMN AEROBIC: CPT

## 2017-08-28 PROCEDURE — 700111 HCHG RX REV CODE 636 W/ 250 OVERRIDE (IP)

## 2017-08-28 PROCEDURE — 700101 HCHG RX REV CODE 250

## 2017-08-28 PROCEDURE — 99232 SBSQ HOSP IP/OBS MODERATE 35: CPT | Mod: GC | Performed by: INTERNAL MEDICINE

## 2017-08-28 PROCEDURE — 700105 HCHG RX REV CODE 258: Performed by: STUDENT IN AN ORGANIZED HEALTH CARE EDUCATION/TRAINING PROGRAM

## 2017-08-28 PROCEDURE — 87186 SC STD MICRODIL/AGAR DIL: CPT

## 2017-08-28 PROCEDURE — 0J9H3ZZ DRAINAGE OF LEFT LOWER ARM SUBCUTANEOUS TISSUE AND FASCIA, PERCUTANEOUS APPROACH: ICD-10-PCS | Performed by: SURGERY

## 2017-08-28 PROCEDURE — 160048 HCHG OR STATISTICAL LEVEL 1-5: Performed by: SURGERY

## 2017-08-28 PROCEDURE — 87077 CULTURE AEROBIC IDENTIFY: CPT

## 2017-08-28 PROCEDURE — 160028 HCHG SURGERY MINUTES - 1ST 30 MINS LEVEL 3: Performed by: SURGERY

## 2017-08-28 PROCEDURE — 87075 CULTR BACTERIA EXCEPT BLOOD: CPT

## 2017-08-28 PROCEDURE — 160036 HCHG PACU - EA ADDL 30 MINS PHASE I: Performed by: SURGERY

## 2017-08-28 PROCEDURE — 87205 SMEAR GRAM STAIN: CPT

## 2017-08-28 RX ORDER — BUPIVACAINE HYDROCHLORIDE 2.5 MG/ML
INJECTION, SOLUTION INFILTRATION; PERINEURAL
Status: DISCONTINUED | OUTPATIENT
Start: 2017-08-28 | End: 2017-08-28 | Stop reason: HOSPADM

## 2017-08-28 RX ADMIN — CLINDAMYCIN IN 5 PERCENT DEXTROSE 600 MG: 12 INJECTION, SOLUTION INTRAVENOUS at 21:17

## 2017-08-28 RX ADMIN — SERTRALINE 50 MG: 50 TABLET, FILM COATED ORAL at 21:17

## 2017-08-28 RX ADMIN — CLINDAMYCIN IN 5 PERCENT DEXTROSE 600 MG: 12 INJECTION, SOLUTION INTRAVENOUS at 05:19

## 2017-08-28 RX ADMIN — SODIUM CHLORIDE: 9 INJECTION, SOLUTION INTRAVENOUS at 21:16

## 2017-08-28 RX ADMIN — CLINDAMYCIN IN 5 PERCENT DEXTROSE 600 MG: 12 INJECTION, SOLUTION INTRAVENOUS at 13:54

## 2017-08-28 ASSESSMENT — ENCOUNTER SYMPTOMS
HEADACHES: 0
BLURRED VISION: 0
MYALGIAS: 0
CONSTIPATION: 0
NAUSEA: 0
HEARTBURN: 0
WEAKNESS: 0
DIARRHEA: 0
DIZZINESS: 0
DEPRESSION: 1
FALLS: 0
CHILLS: 0
VOMITING: 0
FEVER: 0
HEMOPTYSIS: 0
DOUBLE VISION: 0
PALPITATIONS: 0
ABDOMINAL PAIN: 0
SHORTNESS OF BREATH: 0
COUGH: 0
TINGLING: 0

## 2017-08-28 ASSESSMENT — PAIN SCALES - GENERAL
PAINLEVEL_OUTOF10: 0

## 2017-08-28 ASSESSMENT — LIFESTYLE VARIABLES: SUBSTANCE_ABUSE: 0

## 2017-08-28 NOTE — CARE PLAN
Problem: Safety  Goal: Will remain free from falls    Intervention: Assess risk factors for falls  Pt is up self with a steady gait. Non slip socks on.  Bed in the locked position. Call light and personal belongings within reach.       Problem: Discharge Barriers/Planning  Goal: Patient's continuum of care needs will be met    Intervention: Assess potential discharge barriers on admission and throughout hospital stay  Pt is on IV abx.

## 2017-08-28 NOTE — WOUND TEAM
Wound team consult placed regarding Pt's left wrist.  Pt is currently scheduled for I&D with Dr Crum today for the wrist.  No wound team intervention at this time.

## 2017-08-28 NOTE — CARE PLAN
Problem: Safety  Goal: Will remain free from falls  Outcome: PROGRESSING AS EXPECTED  Ambulates steadily around the hallways, tolerates well.     Problem: Venous Thromboembolism (VTW)/Deep Vein Thrombosis (DVT) Prevention:  Goal: Patient will participate in Venous Thrombosis (VTE)/Deep Vein Thrombosis (DVT)Prevention Measures  Ambulates around the hallways     08/28/17 1134   OTHER   Pharmacologic Prophylaxis Used LMWH: Enoxaparin(Lovenox)       Problem: Knowledge Deficit  Goal: Knowledge of disease process/condition, treatment plan, diagnostic tests, and medications will improve  Outcome: PROGRESSING AS EXPECTED  Pt. Scheduled today I and D of wound on L wrist, maintained on NPO.

## 2017-08-28 NOTE — PROGRESS NOTES
Assumed care of pt. Awake, alert and orientedx4, with intact PIV running on fluids. Complaints of pain/discomfort on L wrist not needing pain meds as of this moment. Seen on rounds by [facility] team this morning. Pt. Scheduled today for I and D per schedule board, pt. Aware and kept NPO. Pt. Was noted to be ambulating around the hallways this morning, tolerates well. Needs attended.

## 2017-08-28 NOTE — PROGRESS NOTES
Patient alert and oriented, very pleasant.   L wrist wound covered with foam, minimal dressing. R wrist wound open to air with sutures approximated.   Tolerating IV antibiotics.   Wound culture positive for staph aureus, [facility] notified.   US/L extremity resulted, possible abscess, [facility] notified.

## 2017-08-28 NOTE — PROGRESS NOTES
Pt. Taken down to pre op for I and D of L wrist wound today, kept NPO. Saline locked PIV. Armband located on L ankle.pt. Stable awake and alert when transported.

## 2017-08-28 NOTE — PROGRESS NOTES
Internal Medicine Interval Note    Name Herb Angulo     1960   Age/Sex 57 y.o. male   MRN [id number]   Code Status Full     After 5PM or if no immediate response to page, please call for cross-coverage  Attending/Team: Rohini/Green See Patient List for primary contact information  Call [phone] to page    1st Call - Day Intern (R1):   Omer Navarro 2nd Call - Day Sr. Resident (R2/R3):   Kathy Blake         Reason for interval visit  (Principal Problem)   Left wrist cellulitis/abscess    Interval Problem Daily Status Update  (24 hours)   Pt states he is continuing to feel better. No fever/chills, N/V, or diarrhea. No CP or SOB. Denies SI/HI.   U/S yesterday showed findings c/w abscess. Surgery contacted; pt on schedule today for I&D.     Review of Systems   Constitutional: Negative for chills, fever and malaise/fatigue.   HENT: Negative for hearing loss.    Eyes: Negative for blurred vision and double vision.   Respiratory: Negative for cough, hemoptysis and shortness of breath.    Cardiovascular: Negative for chest pain and palpitations.   Gastrointestinal: Negative for abdominal pain, constipation, diarrhea, heartburn, nausea and vomiting.   Genitourinary: Negative for dysuria and urgency.   Musculoskeletal: Negative for falls and myalgias.   Skin: Negative for itching and rash.   Neurological: Negative for dizziness, tingling, weakness and headaches.   Psychiatric/Behavioral: Positive for depression. Negative for substance abuse and suicidal ideas.       Consultants/Specialty  None    Disposition  Inpatient pending further medical management.    Quality Measures    Reviewed items::  Labs reviewed and Radiology images reviewed  Alfaro catheter::  No Alfaro  DVT prophylaxis pharmacological::  Enoxaparin (Lovenox)  Abx: Clindamycin, day 2        Physical Exam       Vitals:    17 1920 17 0410 17 0745 17 0826   BP: 140/91 159/86 105/57 121/80   Pulse: 76 76 62 68    Resp: 16 18 18 16   Temp: 36.4 °C (97.6 °F) 36.8 °C (98.2 °F) 37.1 °C (98.8 °F) 37.1 °C (98.8 °F)   SpO2: 92% 96% 98% 96%   Weight:       Height:         Body mass index is 35.46 kg/m².    Oxygen Therapy:  Pulse Oximetry: 96 %, O2 (LPM): 0, O2 Delivery: None (Room Air)    Physical Exam   Constitutional: He is oriented to person, place, and time. No distress.   HENT:   Head: Normocephalic and atraumatic.   Eyes: EOM are normal. Pupils are equal, round, and reactive to light. No scleral icterus.   Neck: Normal range of motion. Neck supple. No JVD present.   Cardiovascular: Normal rate and regular rhythm.    Pulmonary/Chest: Effort normal and breath sounds normal.   Abdominal: Soft. Bowel sounds are normal. He exhibits no distension. There is no tenderness.   Musculoskeletal: He exhibits no edema or tenderness.   Lymphadenopathy:     He has no cervical adenopathy.   Neurological: He is alert and oriented to person, place, and time.   Skin: Skin is warm and dry. He is not diaphoretic.   About 5 cm laceration bilaterally on his wrist. R hand healing with clean eschar and sutures in place. L hand with purulent thick yellow white discharge. Please refer to clinical images for further reference.          Lab Data Review:         8/28/2017  2:42 PM    Recent Labs      08/26/17 2210 08/27/17   0819   SODIUM  139  137   POTASSIUM  3.8  3.9   CHLORIDE  107  107   CO2  22  24   BUN  11  10   CREATININE  1.15  1.05   MAGNESIUM   --   2.3   CALCIUM  9.0  8.5       Recent Labs      08/26/17 2210 08/27/17   0819   ALTSGPT  16  15   ASTSGOT  16  14   ALKPHOSPHAT  76  74   TBILIRUBIN  0.4  0.5   GLUCOSE  118*  106*       Recent Labs      08/26/17 2210 08/27/17   0819   RBC  4.52*  4.11*   HEMOGLOBIN  13.7*  13.0*   HEMATOCRIT  40.5*  37.5*   PLATELETCT  290  230       Recent Labs      08/26/17 2210 08/27/17   0819   WBC  7.2  5.4   NEUTSPOLYS  60.60  69.40   LYMPHOCYTES  27.70  22.40   MONOCYTES  9.10  6.90   EOSINOPHILS   1.70  0.70   BASOPHILS  0.60  0.40   ASTSGOT  16  14   ALTSGPT  16  15   ALKPHOSPHAT  76  74   TBILIRUBIN  0.4  0.5           Assessment/Plan     * Cellulitis   Assessment & Plan    Left wrist cellulitis with purulent drainage s/p bilateral wrist laceration. TDAP shot received 8/27.   - No leukocytosis or fevers.  Plan:  -Wound Cx obtained: pen-resistant staph aureus  -BCx 8/27, NGTD  -IV antibiotics with clindamycin, currently day 2  -IV fluid  -Left wrist U/S showed findings c/w abscess; pt scheduled with surgery for I&D today              Depression   Assessment & Plan    Hx of depression with suicidal attempt on 8/18. Denies any current SI or HI. Contributory factors homelessness and drug abuse(meth and cannabinoid). Sober since 8/20. UDS only positive for cannabinoids.   -zoloft 50mg initiated 8/27  -will need outpatient referral to psychiatry  -currently living in soberhouse for rehab.         Substance abuse- (present on admission)   Assessment & Plan    H/o meth use (inhaled) and cannabis use. Last drug use 8/20. UDS positive for cannabinoid  -Counseling and encouragement provided.

## 2017-08-28 NOTE — PROGRESS NOTES
Assumed care of patient at 1915, received report from day RN at that time. Communication board updated and reviewed with pt. Pt is up self.  Pt denies pain at this time. Assessment complete. No other needs at this time. Will continue to monitor.

## 2017-08-29 VITALS
OXYGEN SATURATION: 96 % | TEMPERATURE: 97.9 F | WEIGHT: 268.74 LBS | SYSTOLIC BLOOD PRESSURE: 136 MMHG | HEIGHT: 73 IN | HEART RATE: 80 BPM | DIASTOLIC BLOOD PRESSURE: 76 MMHG | BODY MASS INDEX: 35.62 KG/M2 | RESPIRATION RATE: 18 BRPM

## 2017-08-29 LAB
ALBUMIN SERPL BCP-MCNC: 3.5 G/DL (ref 3.2–4.9)
ALBUMIN/GLOB SERPL: 1.3 G/DL
ALP SERPL-CCNC: 69 U/L (ref 30–99)
ALT SERPL-CCNC: 13 U/L (ref 2–50)
ANION GAP SERPL CALC-SCNC: 5 MMOL/L (ref 0–11.9)
AST SERPL-CCNC: 11 U/L (ref 12–45)
BASOPHILS # BLD AUTO: 0.4 % (ref 0–1.8)
BASOPHILS # BLD: 0.02 K/UL (ref 0–0.12)
BILIRUB SERPL-MCNC: 0.6 MG/DL (ref 0.1–1.5)
BUN SERPL-MCNC: 8 MG/DL (ref 8–22)
CALCIUM SERPL-MCNC: 8.5 MG/DL (ref 8.5–10.5)
CHLORIDE SERPL-SCNC: 108 MMOL/L (ref 96–112)
CO2 SERPL-SCNC: 26 MMOL/L (ref 20–33)
CREAT SERPL-MCNC: 1.05 MG/DL (ref 0.5–1.4)
EOSINOPHIL # BLD AUTO: 0.09 K/UL (ref 0–0.51)
EOSINOPHIL NFR BLD: 1.6 % (ref 0–6.9)
ERYTHROCYTE [DISTWIDTH] IN BLOOD BY AUTOMATED COUNT: 39.8 FL (ref 35.9–50)
GFR SERPL CREATININE-BSD FRML MDRD: >60 ML/MIN/1.73 M 2
GLOBULIN SER CALC-MCNC: 2.7 G/DL (ref 1.9–3.5)
GLUCOSE SERPL-MCNC: 100 MG/DL (ref 65–99)
GRAM STN SPEC: NORMAL
HCT VFR BLD AUTO: 37.6 % (ref 42–52)
HGB BLD-MCNC: 12.7 G/DL (ref 14–18)
IMM GRANULOCYTES # BLD AUTO: 0.02 K/UL (ref 0–0.11)
IMM GRANULOCYTES NFR BLD AUTO: 0.4 % (ref 0–0.9)
LYMPHOCYTES # BLD AUTO: 1.04 K/UL (ref 1–4.8)
LYMPHOCYTES NFR BLD: 18.8 % (ref 22–41)
MCH RBC QN AUTO: 30.5 PG (ref 27–33)
MCHC RBC AUTO-ENTMCNC: 33.8 G/DL (ref 33.7–35.3)
MCV RBC AUTO: 90.4 FL (ref 81.4–97.8)
MONOCYTES # BLD AUTO: 0.49 K/UL (ref 0–0.85)
MONOCYTES NFR BLD AUTO: 8.8 % (ref 0–13.4)
NEUTROPHILS # BLD AUTO: 3.88 K/UL (ref 1.82–7.42)
NEUTROPHILS NFR BLD: 70 % (ref 44–72)
NRBC # BLD AUTO: 0 K/UL
NRBC BLD AUTO-RTO: 0 /100 WBC
PLATELET # BLD AUTO: 225 K/UL (ref 164–446)
PMV BLD AUTO: 9.7 FL (ref 9–12.9)
POTASSIUM SERPL-SCNC: 4.1 MMOL/L (ref 3.6–5.5)
PROT SERPL-MCNC: 6.2 G/DL (ref 6–8.2)
RBC # BLD AUTO: 4.16 M/UL (ref 4.7–6.1)
SIGNIFICANT IND 70042: NORMAL
SITE SITE: NORMAL
SODIUM SERPL-SCNC: 139 MMOL/L (ref 135–145)
SOURCE SOURCE: NORMAL
WBC # BLD AUTO: 5.5 K/UL (ref 4.8–10.8)

## 2017-08-29 PROCEDURE — 36415 COLL VENOUS BLD VENIPUNCTURE: CPT

## 2017-08-29 PROCEDURE — 700101 HCHG RX REV CODE 250: Performed by: STUDENT IN AN ORGANIZED HEALTH CARE EDUCATION/TRAINING PROGRAM

## 2017-08-29 PROCEDURE — 700111 HCHG RX REV CODE 636 W/ 250 OVERRIDE (IP): Performed by: STUDENT IN AN ORGANIZED HEALTH CARE EDUCATION/TRAINING PROGRAM

## 2017-08-29 PROCEDURE — 80053 COMPREHEN METABOLIC PANEL: CPT

## 2017-08-29 PROCEDURE — 99239 HOSP IP/OBS DSCHRG MGMT >30: CPT | Mod: GC | Performed by: INTERNAL MEDICINE

## 2017-08-29 PROCEDURE — 85025 COMPLETE CBC W/AUTO DIFF WBC: CPT

## 2017-08-29 RX ORDER — AMOXICILLIN AND CLAVULANATE POTASSIUM 875; 125 MG/1; MG/1
1 TABLET, FILM COATED ORAL 2 TIMES DAILY
Qty: 6 TAB | Refills: 0 | Status: SHIPPED | OUTPATIENT
Start: 2017-08-29

## 2017-08-29 RX ADMIN — CLINDAMYCIN IN 5 PERCENT DEXTROSE 600 MG: 12 INJECTION, SOLUTION INTRAVENOUS at 06:26

## 2017-08-29 RX ADMIN — CLINDAMYCIN IN 5 PERCENT DEXTROSE 600 MG: 12 INJECTION, SOLUTION INTRAVENOUS at 14:23

## 2017-08-29 RX ADMIN — ENOXAPARIN SODIUM 40 MG: 100 INJECTION SUBCUTANEOUS at 09:23

## 2017-08-29 ASSESSMENT — ENCOUNTER SYMPTOMS
FEVER: 0
CHILLS: 0
NAUSEA: 0
COUGH: 0
WEAKNESS: 0
VOMITING: 0

## 2017-08-29 ASSESSMENT — PAIN SCALES - GENERAL: PAINLEVEL_OUTOF10: 0

## 2017-08-29 NOTE — CARE PLAN
Problem: Safety  Goal: Will remain free from falls  Pt up self. Refuses bed alarm. Safety precautions in place. Bed in low position, treaded socks on, personal possessions in reach, call light in reach and pt using it to call for assistance.     Problem: Infection  Goal: Will remain free from infection  Pt receiving IV clindamycin q8hrs. Afebrile.

## 2017-08-29 NOTE — PROGRESS NOTES
Surgical Progress Note    Author: Loyda Farfan Date & Time created: 2017   8:32 AM     Interval Events:  POD #1 s/p I&D left wrist abscess    Doing well. Afebrile.  Minimal tenderness.  No N/V.  Cultures pending.  Continue antibiotics.  Dressing changes Q shift - we to dry.    Review of Systems   Constitutional: Negative for chills and fever.   Respiratory: Negative for cough.    Cardiovascular: Negative for chest pain.   Gastrointestinal: Negative for nausea and vomiting.   Neurological: Negative for weakness.   All other systems reviewed and are negative.    Hemodynamics:  Temp (24hrs), Av.8 °C (98.3 °F), Min:36.6 °C (97.9 °F), Max:37.1 °C (98.8 °F)  Temperature: 36.9 °C (98.5 °F)  Pulse  Av.7  Min: 56  Max: 95Heart Rate (Monitored): 65  Blood Pressure: 135/87, NIBP: 124/84     Respiratory:    Respiration: 16, Pulse Oximetry: 95 %           Neuro:  GCS       Fluids:    Intake/Output Summary (Last 24 hours) at 17 0832  Last data filed at 17 1815   Gross per 24 hour   Intake              950 ml   Output                5 ml   Net              945 ml        Current Diet Order   Procedures   • DIET ORDER     Physical Exam   Constitutional: He is oriented to person, place, and time. He appears well-developed. No distress.   HENT:   Head: Normocephalic.   Cardiovascular: Normal rate and regular rhythm.    Pulmonary/Chest: Effort normal. No respiratory distress.   Abdominal: Soft. He exhibits no distension. There is no tenderness.   Musculoskeletal: Normal range of motion. He exhibits tenderness. He exhibits no edema.   Left wrist dressing CDI   Neurological: He is alert and oriented to person, place, and time.   Skin: Skin is warm and dry.   Psychiatric: He has a normal mood and affect.   Nursing note and vitals reviewed.    Labs:  Recent Results (from the past 24 hour(s))   CBC WITH DIFFERENTIAL    Collection Time: 17  7:45 AM   Result Value Ref Range    WBC 5.5 4.8 - 10.8 K/uL    RBC  4.16 (L) 4.70 - 6.10 M/uL    Hemoglobin 12.7 (L) 14.0 - 18.0 g/dL    Hematocrit 37.6 (L) 42.0 - 52.0 %    MCV 90.4 81.4 - 97.8 fL    MCH 30.5 27.0 - 33.0 pg    MCHC 33.8 33.7 - 35.3 g/dL    RDW 39.8 35.9 - 50.0 fL    Platelet Count 225 164 - 446 K/uL    MPV 9.7 9.0 - 12.9 fL    Neutrophils-Polys 70.00 44.00 - 72.00 %    Lymphocytes 18.80 (L) 22.00 - 41.00 %    Monocytes 8.80 0.00 - 13.40 %    Eosinophils 1.60 0.00 - 6.90 %    Basophils 0.40 0.00 - 1.80 %    Immature Granulocytes 0.40 0.00 - 0.90 %    Nucleated RBC 0.00 /100 WBC    Neutrophils (Absolute) 3.88 1.82 - 7.42 K/uL    Lymphs (Absolute) 1.04 1.00 - 4.80 K/uL    Monos (Absolute) 0.49 0.00 - 0.85 K/uL    Eos (Absolute) 0.09 0.00 - 0.51 K/uL    Baso (Absolute) 0.02 0.00 - 0.12 K/uL    Immature Granulocytes (abs) 0.02 0.00 - 0.11 K/uL    NRBC (Absolute) 0.00 K/uL   COMP METABOLIC PANEL    Collection Time: 08/29/17  7:45 AM   Result Value Ref Range    Sodium 139 135 - 145 mmol/L    Potassium 4.1 3.6 - 5.5 mmol/L    Chloride 108 96 - 112 mmol/L    Co2 26 20 - 33 mmol/L    Anion Gap 5.0 0.0 - 11.9    Glucose 100 (H) 65 - 99 mg/dL    Bun 8 8 - 22 mg/dL    Creatinine 1.05 0.50 - 1.40 mg/dL    Calcium 8.5 8.5 - 10.5 mg/dL    AST(SGOT) 11 (L) 12 - 45 U/L    ALT(SGPT) 13 2 - 50 U/L    Alkaline Phosphatase 69 30 - 99 U/L    Total Bilirubin 0.6 0.1 - 1.5 mg/dL    Albumin 3.5 3.2 - 4.9 g/dL    Total Protein 6.2 6.0 - 8.2 g/dL    Globulin 2.7 1.9 - 3.5 g/dL    A-G Ratio 1.3 g/dL   ESTIMATED GFR    Collection Time: 08/29/17  7:45 AM   Result Value Ref Range    GFR If African American >60 >60 mL/min/1.73 m 2    GFR If Non African American >60 >60 mL/min/1.73 m 2     Medical Decision Making, by Problem:  Active Hospital Problems    Diagnosis   • Substance abuse [F19.10]     Priority: Low   • Cellulitis [L03.90]     Left wrist s/p wound closure in ED 1 week ago  8/28 - I&D left wrist abscess  Cultures pending     • Depression [F32.9]     Plan:  POD #1 s/p I&D left wrist  abscess    Doing well. Afebrile.  Minimal tenderness.  No N/V.  Cultures pending.  Continue antibiotics.  Dressing changes Q shift - we to dry.    Quality Measures:    Reviewed items::  Labs reviewed, Medications reviewed and Radiology images reviewed  Alfaro catheter::  No Alfaro  DVT prophylaxis pharmacological::  Not indicated at this time, ambulatory  DVT prophylaxis - mechanical:  SCDs  Ulcer Prophylaxis::  Yes  Antibiotics:  Treating active infection/contamination beyond 24 hours perioperative coverage      Discussed patient condition with RN, Patient and Dr. Crum

## 2017-08-29 NOTE — PROGRESS NOTES
Pt AOx4, denies pain at this time. IV abx administered, no adverse rxn noted. Pt resting quietly in bed, needs met. Call light within reach, personal belongings available, bed in lowest position, treaded socks on. Hourly rounding in place.

## 2017-08-29 NOTE — OP REPORT
DATE OF SERVICE:  08/28/2017    PREOPERATIVE DIAGNOSIS:  Left wrist infection status post self-inflicted   lacerations to left wrist.    POSTOPERATIVE DIAGNOSIS:  Left wrist infection status post self-inflicted   lacerations to left wrist.    PROCEDURE:  Incision and drainage of left wrist wound.    SURGEON:  Richelle Pradhan MD    ASSISTANT:  Loyda Farfan PA-C    ANESTHESIA:  Laryngeal mask.    ANESTHESIOLOGIST:  Casey Carpio MD    INDICATIONS:  The patient is a 57-year-old gentleman who apparently attempted   suicide by cutting his wrist two weeks ago.  He came in to the emergency room   two days ago with a wound infection.  He is being brought at this time for   debridement.    FINDINGS:  The wound was opened.  There was necrotic tissue that was debrided   sharply from the subcutaneous tissue, was washed out, and packed open.    DESCRIPTION OF PROCEDURE:  Patient was identified and consented, he was   brought to the operating room and placed in supine position.  The patient   underwent general laryngeal mask anesthetic.  The left wrist was prepped and   draped in sterile fashion.  The wound was reopened.  Purulent drainage was   found, was cultured.  Necrotic tissue was debrided back to the subcutaneous   tissue and skin.  Wound was irrigated and hemostasis secured.  Wet to dry   dressing was placed.  Patient was extubated and taken to recovery in stable   condition.  All sponge and needle counts were correct.       ____________________________________     RICHELLE PRADHAN MD    [facility]    DD:  08/28/2017 17:23:25  DT:  08/28/2017 17:54:14    D#:  [id number]  Job#:  [id number]    cc: RICHELLE PRADHAN MD, CASEY CARPIO MD

## 2017-08-29 NOTE — PROGRESS NOTES
Patient seen and examined  Has wound on left wrist that was sutured in ED last week  Now has abscess and needs surgical debridement  To OR for I&D

## 2017-08-29 NOTE — CONSULTS
DATE OF SERVICE:  08/28/2017    REQUESTING PHYSICIAN:  Dr. Nova.    REASON FOR CONSULTATION:  The patient is a 57-year-old gentleman who   apparently 1 week ago _____ knife to both of his wrists.  He came to the   emergency room, where they were washed out and closed.  He subsequently now   came in the emergency room with evidence of cellulitis and abscess.  I have   been asked to see the patient in regards to this.    PAST MEDICAL HISTORY:  Illnesses are history of bipolar disorder.    PAST SURGICAL HISTORY:  None.    MEDICATIONS:  Currently are antibiotics.    ALLERGIES:  None.    SOCIAL HISTORY:  He apparently uses meth and marijuana, but now is currently   living in a sober living facility.    REVIEW OF SYSTEMS:  Otherwise unremarkable.    PHYSICAL EXAMINATION:  VITAL SIGNS:  He is afebrile.  GENERAL:  Alert and cooperative.  HEENT:  _____.  NECK:  Supple.  EXTREMITIES:  Demonstrated a cellulitis with purulent drainage from wound on   his left wrist.  He has normal function of his hand.  He is able to make a   tight wrist and straighten this all of his fingers.  Neurovascular is intact.  NEUROLOGIC:  Neurologically he is intact as well.    IMPRESSION:  A 57-year-old male with a left wrist abscess.    PLAN:  For incision and drainage of this.  Procedure described to the patient   as well as the risks including bleeding, infection, and anesthetic risk.  They   understand and wish to proceed.       ____________________________________     MD PARISA RUTLEDGE / [place]    DD:  08/28/2017 17:09:55  DT:  08/28/2017 17:57:04    D#:  [id number]  Job#:  [id number]

## 2017-08-29 NOTE — PROGRESS NOTES
Pt AOx4, refuses bed alarm despite education on safety/risks. Pt uses call light appropriately to call for assistance.

## 2017-08-30 ENCOUNTER — OFFICE VISIT (OUTPATIENT)
Dept: INTERNAL MEDICINE | Facility: MEDICAL CENTER | Age: 57
End: 2017-08-30
Payer: MEDICAID

## 2017-08-30 VITALS
BODY MASS INDEX: 35.78 KG/M2 | HEIGHT: 73 IN | SYSTOLIC BLOOD PRESSURE: 123 MMHG | WEIGHT: 270 LBS | OXYGEN SATURATION: 93 % | TEMPERATURE: 97.2 F | DIASTOLIC BLOOD PRESSURE: 87 MMHG | HEART RATE: 79 BPM

## 2017-08-30 DIAGNOSIS — L03.119 CELLULITIS OF UPPER EXTREMITY, UNSPECIFIED LATERALITY: ICD-10-CM

## 2017-08-30 DIAGNOSIS — F19.10 SUBSTANCE ABUSE (HCC): ICD-10-CM

## 2017-08-30 DIAGNOSIS — Z29.9 ENCOUNTER FOR PREVENTIVE MEASURE: ICD-10-CM

## 2017-08-30 DIAGNOSIS — F32.A DEPRESSION, UNSPECIFIED DEPRESSION TYPE: ICD-10-CM

## 2017-08-30 PROCEDURE — 99203 OFFICE O/P NEW LOW 30 MIN: CPT | Mod: GE,25 | Performed by: INTERNAL MEDICINE

## 2017-08-30 PROCEDURE — 90686 IIV4 VACC NO PRSV 0.5 ML IM: CPT | Performed by: INTERNAL MEDICINE

## 2017-08-30 PROCEDURE — 90471 IMMUNIZATION ADMIN: CPT | Performed by: INTERNAL MEDICINE

## 2017-08-30 ASSESSMENT — PATIENT HEALTH QUESTIONNAIRE - PHQ9
5. POOR APPETITE OR OVEREATING: 1 - SEVERAL DAYS
CLINICAL INTERPRETATION OF PHQ2 SCORE: 4
SUM OF ALL RESPONSES TO PHQ QUESTIONS 1-9: 12

## 2017-08-30 NOTE — DISCHARGE PLANNING
Care Transition Team Assessment    [facility] with [facility] ([facility]  and she is following pt in the community. She has arranged for an apt for pt to establish with a PCP Dr. Camacho 8/30/17 at 2pm. Pt is currently involved with [facility] program and is d/c there once medically stable. This writer met with pt at bedside and he is d/c today and states his dtr is coming to pick him up to take him back to [facility] housing program. Pt states he currently has no needs and is able to fill his prescriptions.     Information Source  Orientation : Oriented x 4  Information Given By: Patient  Informant's Name: Herb  Who is responsible for making decisions for patient? : Patient         Elopement Risk  Legal Hold: No  Ambulatory or Self Mobile in Wheelchair: Yes  Disoriented: No  Psychiatric Symptoms: None  History of Wandering: No  Elopement this Admit: No  Vocalizing Wanting to Leave: No  Displays Behaviors, Body Language Wanting to Leave: No-Not at Risk for Elopement  Elopement Risk: Not at Risk for Elopement    Interdisciplinary Discharge Planning  Primary Care Physician:  (Pt has a new PCP apt 8/30/17 with Dr. Camacho at 2pm )  Lives with - Patient's Self Care Capacity: Other (Comments) ([facility] providing housing for pt)  Support Systems: Children  Housing / Facility: Other (Comments) (sober living)  Name of Care Facility: sober living  Able to Return to Previous ADL's: Yes  Mobility Issues: No  Prior Services: None  Patient Expects to be Discharged to:: home  Assistance Needed: No  Durable Medical Equipment: Not Applicable    Discharge Preparedness  What is your plan after discharge?: Home with help  What are your discharge supports?: Child, Other (comment) (community agencies, [facility], Medicaid worker)  Prior Functional Level: Ambulatory, Independent with Activities of Daily Living, Independent with Medication Management  Difficulity with ADLs: None  Difficulity with IADLs:  None    Functional Assesment  Prior Functional Level: Ambulatory, Independent with Activities of Daily Living, Independent with Medication Management    Finances  Financial Barriers to Discharge: No  Prescription Coverage: Yes    Vision / Hearing Impairment  Vision Impairment : No  Hearing Impairment : No    Values / Beliefs / Concerns  Values / Beliefs Concerns : No         Domestic Abuse  Have you ever been the victim of abuse or violence?: No    Psychological Assessment  History of Substance Abuse: Methamphetamine, Marijuana  Date Last Used - Methamphetamine: 8/20/17  Substance Abuse Comments: per pt, he has been sober since 8/20/17 and s involved with [facility] Medicaid program and in housing currently   History of Psychiatric Problems: Yes (depression)    Discharge Risks or Barriers  Discharge risks or barriers?: No PCP (has PCP apt set up for 8/30/17)  Patient risk factors: No PCP    Anticipated Discharge Information  Anticipated discharge disposition: Home ([facility] )  Discharge Contact Phone Number: [phone]

## 2017-08-30 NOTE — CARE PLAN
Problem: Infection  Goal: Will remain free from infection  Outcome: PROGRESSING AS EXPECTED  Pt will remain free of infection. Ordered antibiotics given per MAR. Daily dressings changes in place. Wound clean, minimal drainage.     Problem: Venous Thromboembolism (VTW)/Deep Vein Thrombosis (DVT) Prevention:  Goal: Patient will participate in Venous Thrombosis (VTE)/Deep Vein Thrombosis (DVT)Prevention Measures  Educated pt on the use of lovenox for DVT prophylaxis. Pt acknowledge understanding.

## 2017-08-30 NOTE — DISCHARGE SUMMARY
Internal Medicine Discharge Summary    Admit Date:  8/26/2017       Discharge Date:  8/29/2017    Service:   [facility] Internal Medicine Green Team  Attending Physician(s):   Dr. Nova  Senior Resident(s):   Dr. Blake  Ariel Resident(s):   Dr. Navarro      Primary Diagnosis:   L wrist abscess    Secondary Diagnoses:                Principal Problem:    Cellulitis POA: Unknown      Overview: Left wrist s/p wound closure in ED 1 week ago      8/28 - I&D left wrist abscess      Cultures pending  Active Problems:    Substance abuse POA: Yes    Depression POA: Unknown  Resolved Problems:    * No resolved hospital problems. *    Hospital Summary (Brief Narrative):       57 y.o. male with PMHx for drug abuse (marijuana, methamphetamine, currently in a sober house and off of drugs for the past 2 weeks), depression with recent suicidal attempt on 8/18 by bilaterally cutting his wrists came in with cellulitis of his L wrist. Patient was initiated on IV clindamycin and wound cultures were obtained(MSSA resistant only to penicillin). Ultrasound was obtained for suspicion of abscess and noted a 4cm multicloculated pocket. Surgery(Dr. Crum) was consulted and I&D was performed on 8/28. Patient will be discharged home to complete a 3 day course of augmentin to complete a total of 5 day course of antibiotics. Patient is to follow up with surgery outpatient. He received TDAP vaccination while inpatient as well.     Of note, is that patient was also initiated on zoloft for his depression during this visit and we recommend continuing it with further titration and adjust with PCP. Appointment set up with Dr. Michelle.    Consultants:     Surgery(Dr. Crum)    Procedures:        I&D    Imaging/ Testing:      US-EXTREMITY NON VASCULAR UNILATERAL LEFT   Final Result      Multiloculated complex fluid collections involving the volar aspect of the left wrist consistent with abscess versus hematoma.        Discharge Medications:          Medication Reconciliation: Completed       Medication List      START taking these medications      Instructions   amoxicillin-clavulanate 875-125 MG Tabs  Commonly known as:  AUGMENTIN   Take 1 Tab by mouth 2 times a day.  Dose:  1 Tab     sertraline 50 MG Tabs  Commonly known as:  ZOLOFT   Take 1 Tab by mouth every evening.  Dose:  50 mg            Disposition: Sober house    Diet:   Regular    Activity:   As toelrated    Instructions:      The patient was instructed to return to the ER in the event of worsening symptoms. I have counseled the patient on the importance of compliance and the patient has agreed to proceed with all medical recommendations and follow up plan indicated above.   The patient understands that all medications come with benefits and risks. Risks may include permanent injury or death and these risks can be minimized with close reassessment and monitoring.        Primary Care Provider:    Dr. Michelle  Discharge summary faxed to primary care provider:  Completed  Copy of discharge summary given to the patient: Completed      Follow up appointment details :      Please f/u with surgery  Please f/u with PCP for appointment tomorrow      Time spent on discharge day patient visit, preparing discharge paperwork and arranging for patient follow up.      Discharge Time (Minutes) :   60mins        Condition on Discharge    ______________________________________________________________________    Interval history/exam for day of discharge:    Patient doing well, discussed case with Dr. Crum and ok for discharge with outpatient f/u. Denies any suicidal or homicidal ideation.    Vitals:    08/28/17 2143 08/29/17 0400 08/29/17 0710 08/29/17 1457   BP: 137/77 135/87 142/98 136/76   Pulse: 91 86 68 80   Resp: 16 16 18 18   Temp: 36.8 °C (98.3 °F) 36.9 °C (98.5 °F) 36.6 °C (97.8 °F) 36.6 °C (97.9 °F)   SpO2: 94% 95% 94% 96%   Weight:       Height:         Weight/BMI: Body mass index is 35.46 kg/m².  Pulse  Oximetry: 96 %, O2 (LPM): 0, O2 Delivery: None (Room Air)    General: Doing well, no acute issues  CVS: Dual HS, no m/r/g  PULM: CTAB      Most Recent Labs:    Lab Results   Component Value Date/Time    WBC 5.5 08/29/2017 07:45 AM    RBC 4.16 (L) 08/29/2017 07:45 AM    HEMOGLOBIN 12.7 (L) 08/29/2017 07:45 AM    HEMATOCRIT 37.6 (L) 08/29/2017 07:45 AM    MCV 90.4 08/29/2017 07:45 AM    MCH 30.5 08/29/2017 07:45 AM    MCHC 33.8 08/29/2017 07:45 AM    MPV 9.7 08/29/2017 07:45 AM    NEUTSPOLYS 70.00 08/29/2017 07:45 AM    LYMPHOCYTES 18.80 (L) 08/29/2017 07:45 AM    MONOCYTES 8.80 08/29/2017 07:45 AM    EOSINOPHILS 1.60 08/29/2017 07:45 AM    BASOPHILS 0.40 08/29/2017 07:45 AM      Lab Results   Component Value Date/Time    SODIUM 139 08/29/2017 07:45 AM    POTASSIUM 4.1 08/29/2017 07:45 AM    CHLORIDE 108 08/29/2017 07:45 AM    CO2 26 08/29/2017 07:45 AM    GLUCOSE 100 (H) 08/29/2017 07:45 AM    BUN 8 08/29/2017 07:45 AM    CREATININE 1.05 08/29/2017 07:45 AM      Lab Results   Component Value Date/Time    ALTSGPT 13 08/29/2017 07:45 AM    ASTSGOT 11 (L) 08/29/2017 07:45 AM    ALKPHOSPHAT 69 08/29/2017 07:45 AM    TBILIRUBIN 0.6 08/29/2017 07:45 AM    ALBUMIN 3.5 08/29/2017 07:45 AM    GLOBULIN 2.7 08/29/2017 07:45 AM    INR 0.90 07/05/2016 12:30 PM     Lab Results   Component Value Date/Time    PROTHROMBTM 12.2 07/05/2016 12:30 PM    INR 0.90 07/05/2016 12:30 PM

## 2017-08-30 NOTE — DISCHARGE INSTRUCTIONS
Discharge Instructions    Discharged to home by car with relative. Discharged via walking, hospital escort: Refused.  Special equipment needed: Not Applicable    Be sure to schedule a follow-up appointment with your primary care doctor or any specialists as instructed.     Discharge Plan:   Diet Plan: Discussed  Activity Level: Discussed  Confirmed Follow up Appointment: Patient to Call and Schedule Appointment  Confirmed Symptoms Management: Discussed  Medication Reconciliation Updated: Yes  Influenza Vaccine Indication: Indicated: Not available from distributor/    I understand that a diet low in cholesterol, fat, and sodium is recommended for good health. Unless I have been given specific instructions below for another diet, I accept this instruction as my diet prescription.   Other diet: Regular Diet    Special Instructions: None    · Is patient discharged on Warfarin / Coumadin?   No     · Is patient Post Blood Transfusion?  No    Depression / Suicide Risk    As you are discharged from this [facility] facility, it is important to learn how to keep safe from harming yourself.    Recognize the warning signs:  · Abrupt changes in personality, positive or negative- including increase in energy   · Giving away possessions  · Change in eating patterns- significant weight changes-  positive or negative  · Change in sleeping patterns- unable to sleep or sleeping all the time   · Unwillingness or inability to communicate  · Depression  · Unusual sadness, discouragement and loneliness  · Talk of wanting to die  · Neglect of personal appearance   · Rebelliousness- reckless behavior  · Withdrawal from people/activities they love  · Confusion- inability to concentrate     If you or a loved one observes any of these behaviors or has concerns about self-harm, here's what you can do:  · Talk about it- your feelings and reasons for harming yourself  · Remove any means that you might use to hurt yourself  (examples: pills, rope, extension cords, firearm)  · Get professional help from the community (Mental Health, Substance Abuse, psychological counseling)  · Do not be alone:Call your Safe Contact- someone whom you trust who will be there for you.  · Call your local CRISIS HOTLINE 362-2993 or 369-056-3254  · Call your local Children's Mobile Crisis Response Team [place] (937) 155-3101 or www.[employer]  · Call the toll free National Suicide Prevention Hotlines   · National Suicide Prevention Lifeline 738-104-LBOL (6360)  · [employer] Line Network 800-SUICIDE (401-8641)    Incision and Drainage  Incision and drainage is a procedure in which a sac-like structure (cystic structure) is opened and drained. The area to be drained usually contains material such as pus, fluid, or blood.   LET YOUR CAREGIVER KNOW ABOUT:   · Allergies to medicine.  · Medicines taken, including vitamins, herbs, eyedrops, over-the-counter medicines, and creams.  · Use of steroids (by mouth or creams).  · Previous problems with anesthetics or numbing medicines.  · History of bleeding problems or blood clots.  · Previous surgery.  · Other health problems, including diabetes and kidney problems.  · Possibility of pregnancy, if this applies.  RISKS AND COMPLICATIONS  · Pain.  · Bleeding.  · Scarring.  · Infection.  BEFORE THE PROCEDURE   You may need to have an ultrasound or other imaging tests to see how large or deep your cystic structure is. Blood tests may also be used to determine if you have an infection or how severe the infection is. You may need to have a tetanus shot.  PROCEDURE   The affected area is cleaned with a cleaning fluid. The cyst area will then be numbed with a medicine (local anesthetic). A small incision will be made in the cystic structure. A syringe or catheter may be used to drain the contents of the cystic structure, or the contents may be squeezed out. The area will then be flushed with a cleansing solution.  After cleansing the area, it is often gently packed with a gauze or another wound dressing. Once it is packed, it will be covered with gauze and tape or some other type of wound dressing.   AFTER THE PROCEDURE   · Often, you will be allowed to go home right after the procedure.  · You may be given antibiotic medicine to prevent or heal an infection.  · If the area was packed with gauze or some other wound dressing, you will likely need to come back in 1 to 2 days to get it removed.  · The area should heal in about 14 days.     This information is not intended to replace advice given to you by your health care provider. Make sure you discuss any questions you have with your health care provider.     Document Released: 06/13/2002 Document Revised: 06/18/2013 Document Reviewed: 02/11/2013  [employer] Interactive Patient Education ©2016 Else[employer] Inc.    Dressing Change    *Use Saline with gauze to cover WND and wrap with roll gauze, change every morning and night*    A dressing is a material placed over wounds. It keeps the wound clean, dry, and protected from further injury. This provides an environment that favors wound healing.   BEFORE YOU BEGIN  · Get your supplies together. Things you may need include:  ¨ Saline solution.  ¨ Flexible gauze dressing.  ¨ Medicated cream.  ¨ Tape.  ¨ Gloves.  ¨ Abdominal dressing pads.  ¨ Gauze squares.  ¨ Plastic bags.  · Take pain medicine 30 minutes before the dressing change if you need it.  · Take a shower before you do the first dressing change of the day. Use plastic wrap or a plastic bag to prevent the dressing from getting wet.  REMOVING YOUR OLD DRESSING   · Wash your hands with soap and water. Dry your hands with a clean towel.  · Put on your gloves.  · Remove any tape.  · Carefully remove the old dressing. If the dressing sticks, you may dampen it with warm water to loosen it, or follow your caregiver's specific directions.  · Remove any gauze or packing tape that is in  your wound.  · Take off your gloves.  · Put the gloves, tape, gauze, or any packing tape into a plastic bag.  CHANGING YOUR DRESSING  · Open the supplies.  · Take the cap off the saline solution.  · Open the gauze package so that the gauze remains on the inside of the package.  · Put on your gloves.  · Clean your wound as told by your caregiver.  · If you have been told to keep your wound dry, follow those instructions.  · Your caregiver may tell you to do one or more of the following:  ¨  the gauze. Pour the saline solution over the gauze. Squeeze out the extra saline solution.  ¨ Put medicated cream or other medicine on your wound if you have been told to do so.  ¨ Put the solution soaked gauze only in your wound, not on the skin around it.  ¨ Pack your wound loosely or as told by your caregiver.  ¨ Put dry gauze on your wound.  ¨ Put abdominal dressing pads over the dry gauze if your wet gauze soaks through.  · Tape the abdominal dressing pads in place so they will not fall off. Do not wrap the tape completely around the affected part (arm, leg, abdomen).  · Wrap the dressing pads with a flexible gauze dressing to secure it in place.  · Take off your gloves. Put them in the plastic bag with the old dressing. Tie the bag shut and throw it away.  · Keep the dressing clean and dry until your next dressing change.  · Wash your hands.  SEEK MEDICAL CARE IF:  · Your skin around the wound looks red.  · Your wound feels more tender or sore.  · You see pus in the wound.  · Your wound smells bad.  · You have a fever.  · Your skin around the wound has a rash that itches and burns.  · You see black or yellow skin in your wound that was not there before.  · You feel nauseous, throw up, and feel very tired.     This information is not intended to replace advice given to you by your health care provider. Make sure you discuss any questions you have with your health care provider.     Document Released: 01/25/2006  Document Revised: 03/11/2013 Document Reviewed: 10/29/2012  [employer] Interactive Patient Education ©2016 Elsevier Inc.

## 2017-08-30 NOTE — PROGRESS NOTES
"      Established Patient    Herb presents today with the following:    CC: Follow up post hospital DC.    HPI: 57 y.o homeless male now living in a sober house is here to follow up post hospital discharge where he was treated for cellulitis of wrist following a suicide attempt. Currently the pt states that he's a bit sad, some days are better than others, but denies desire to kill himself and denies plans to hurt himself or others. Reports that he now feels better after the hospital stay and with the initiation of Zoloft. States that he wants to get better and get his life on track and has recently reconciled with his family. He has a long standing history of alcohol and substance abuse, now sober, going to AA meetings regularly.     Pt also reports that he was told he has high blood pressure especially when he gets excited. Denies ever takings meds for BP. Also denies pain in wrists, fever, chills, headaches, changes in vision, palpitations, chest pain, SOB, N/V, changes in urinary and bowel habits. Reports that he has hemorrhoids and rarely gets blood in stool.     Patient Active Problem List    Diagnosis Date Noted   • Right leg pain 09/15/2013     Priority: High   • Rhabdomyolysis 09/14/2013     Priority: High   • Homeless 09/15/2013     Priority: Low   • Bipolar disorder ([facility]) 09/15/2013     Priority: Low   • Substance abuse 09/14/2013     Priority: Low   • Cellulitis 08/27/2017   • Depression 08/27/2017       Current Outpatient Prescriptions   Medication Sig Dispense Refill   • amoxicillin-clavulanate (AUGMENTIN) 875-125 MG Tab Take 1 Tab by mouth 2 times a day. 6 Tab 0   • sertraline (ZOLOFT) 50 MG Tab Take 1 Tab by mouth every evening. 30 Tab 0     No current facility-administered medications for this visit.        ROS: As per HPI. Additional pertinent symptoms as noted below.    All others negative    /87   Pulse 79   Temp 36.2 °C (97.2 °F)   Ht 1.854 m (6' 1\")   Wt 122.5 kg (270 lb)   SpO2 " 93%   BMI 35.62 kg/m²     Physical Exam   Constitutional:  oriented to person, place, and time. No distress.   Eyes: Pupils are equal, round, and reactive to light. No scleral icterus.  Neck: Neck supple. No thyromegaly present.   Cardiovascular: Normal rate, regular rhythm and normal heart sounds.  Exam reveals no gallop and no friction rub.  No murmur heard.  Pulmonary/Chest: Breath sounds normal. Chest wall is not dull to percussion.   Musculoskeletal:   no edema.   Lymphadenopathy: no cervical adenopathy  Neurological: alert and oriented to person, place, and time.   Skin: No cyanosis. Nails show no clubbing.  Other: Right wrist has 3 healing horizontal incisions. Left wrist with post I&D wound, clean without signs of infection.    Note: I have reviewed all pertinent labs and diagnostic tests associated with this visit with specific comments listed under the assessment and plan below    Assessment and Plan    1. Cellulitis of upper extremity, unspecified laterality  Pt made a suicide attempt and the wounds got infected. He was admitted to hospital for IV ABT and was found to have abscess in right wrist. Had I&D done.   New clean dressing applied.  Pt instructed to start Amoxicillin ASAP to prevent infection (pt should be able to  the prescription at the pharmacy tomorrow at the latest)  Follow up with surgery/wound care on September 11.    2. Depression, unspecified depression type  Pt is scoring 11 on PHQ-9. Reports improvement in mood and anxiety. Denies suicidal ideation and plans to hurt himself.   Continue Zoloft - pt to  his prescription tomorrow.  Referral to psychiatry given.    3. Substance abuse  Pt reports hx of alcohol and substance abuse. Now lives in sober house and attends regular AA meetings.    4. Encounter for preventive measure  Pt given referral to GI for screening colonoscopy. He was instructed to get in contact with his  to help set up the appointment - pt  expressed understanding.  Flu shot given this visit.      Followup: Return in about 3 weeks (around 9/20/2017). to follow up on wound healing and lipid panel.      Signed by: Philomena Camacho M.D.

## 2017-08-30 NOTE — PROGRESS NOTES
Assumed care of pt. A&Ox4. RA, tolerates well. No complaints of pain or discomfort, rest. Pt up-self, call for assistance when needed. As dressing in place on LUE on wrist wound, CAMELIA. Regular diet. IV, CDI, running NS @75 mL/hr. Bed in lowest position, treaded socks, call light in place. Discussed plan of care. All needs met at this time.

## 2017-08-30 NOTE — PROGRESS NOTES
Discharge Orders received. PCP appointment set up for pt for tomorrow. Pt understands to follow up with Dr. Crum. Pt has scripts, pt understands to take scripts to pharmacy and to continue taking all medications as scheduled. Pt educated on WND care, pt understands wet to dry dressings and frequency of changed. RN called [facility], update given, they are expecting pt. Pt daughter was going to take pt home, pt stated he would walk since daughter unable to take pt. RN walked pt down stairs. All belongings gathered. WND supplied sent with pt. All needs met. Pt felt safe to discharge.

## 2017-08-31 LAB
BACTERIA SPEC ANAEROBE CULT: NORMAL
SIGNIFICANT IND 70042: NORMAL
SITE SITE: NORMAL
SOURCE SOURCE: NORMAL

## 2017-09-01 LAB
BACTERIA BLD CULT: NORMAL
BACTERIA BLD CULT: NORMAL
SIGNIFICANT IND 70042: NORMAL
SIGNIFICANT IND 70042: NORMAL
SITE SITE: NORMAL
SITE SITE: NORMAL
SOURCE SOURCE: NORMAL
SOURCE SOURCE: NORMAL

## 2017-10-16 ENCOUNTER — APPOINTMENT (OUTPATIENT)
Dept: INTERNAL MEDICINE | Facility: MEDICAL CENTER | Age: 57
End: 2017-10-16
Payer: MEDICAID

## 2019-06-21 ENCOUNTER — HOSPITAL ENCOUNTER (EMERGENCY)
Facility: MEDICAL CENTER | Age: 59
End: 2019-06-21
Attending: EMERGENCY MEDICINE
Payer: MEDICAID

## 2019-06-21 VITALS
HEIGHT: 73 IN | TEMPERATURE: 98 F | HEART RATE: 88 BPM | BODY MASS INDEX: 32.07 KG/M2 | SYSTOLIC BLOOD PRESSURE: 147 MMHG | OXYGEN SATURATION: 97 % | WEIGHT: 242 LBS | RESPIRATION RATE: 14 BRPM | DIASTOLIC BLOOD PRESSURE: 105 MMHG

## 2019-06-21 DIAGNOSIS — R45.851 SUICIDAL IDEATION: ICD-10-CM

## 2019-06-21 LAB
AMPHET UR QL SCN: NEGATIVE
BARBITURATES UR QL SCN: NEGATIVE
BENZODIAZ UR QL SCN: NEGATIVE
BZE UR QL SCN: NEGATIVE
CANNABINOIDS UR QL SCN: POSITIVE
METHADONE UR QL SCN: NEGATIVE
OPIATES UR QL SCN: NEGATIVE
OXYCODONE UR QL SCN: NEGATIVE
PCP UR QL SCN: NEGATIVE
POC BREATHALIZER: 0 PERCENT (ref 0–0.01)
PROPOXYPH UR QL SCN: NEGATIVE

## 2019-06-21 PROCEDURE — 302970 POC BREATHALIZER: Performed by: EMERGENCY MEDICINE

## 2019-06-21 PROCEDURE — 80307 DRUG TEST PRSMV CHEM ANLYZR: CPT

## 2019-06-21 PROCEDURE — 99285 EMERGENCY DEPT VISIT HI MDM: CPT

## 2019-06-21 PROCEDURE — 302970 POC BREATHALIZER

## 2019-06-21 NOTE — ED NOTES
"Chief Complaint   Patient presents with   • Suicidal Ideation     Pt was planning to hang himself tonight, previos suicide attempt slit his wrists a year ago     Pt presented to triage with above complaint. Per pt report pt has been homeless for years and has had previous suicide attempts, pt wants treatment and would like to change his life.     /105   Pulse 89   Temp 36.7 °C (98 °F) (Temporal)   Resp 17   Ht 1.854 m (6' 1\")   Wt 109.8 kg (242 lb)   SpO2 97%   BMI 31.93 kg/m²   "

## 2019-06-21 NOTE — ED NOTES
bob is here to transport pt to [facility]. Belongings obtained from locker 6, 2 bags. Pt cooperative with transport staff.

## 2019-06-21 NOTE — ED NOTES
RN at bedside; pt asleep on gunrey, respirations are equal and non labored. RN will continue to monitor.     1:1 sitter in place

## 2019-06-21 NOTE — DISCHARGE PLANNING
Medical Social Work    Referral: Legal Hold    Intervention: Legal Hold Paperwork given to SW by [employer] RN Sally Bedolla    Legal Hold Initiated: Date: 06/21/19 Time: 0151    Patient’s Insurance Listed on Face Sheet: [place] [employer]    Referrals sent to: [facility], [facility], [place], [facility], [facility]    This referral contains the following information:  1) Face sheet __x__  2) Page 1 and Page 2 of Legal Hold _x___  3) Alert Team Assessment/Psych Assessment _x___  4) Head to toe physical exam __x__  5) Urine Drug Screen _x___  6) Blood Alcohol __x__  7) Vital signs __x__  8) Pregnancy test when applicable _n/a__  9) Medications list _x___    Plan: Patient will transfer to mental health facility once acceptance is obtained.

## 2019-06-21 NOTE — CONSULTS
"[facility]   TRIAGE ASSESSMENT    Name: Herb Angulo  MRN: [id number]  : 1960  Age: 58 y.o.  Date of assessment: 2019  PCP: Philomena Camacho M.D.  Persons in attendance: Patient    CHIEF COMPLAINT/PRESENTING ISSUE (as stated by Herb Angulo): 58 year old male planned to hang himself tonight...\"I just can't go on\".  He is so disgusted with himself and what he has created of his life...he is homeless and his family is not willing to help him in any way.  \"Everytime I get a job, or anything, I manage to sabotage it\".  Has taken meds a few times, \"but I don't stay on them long enough to see if they would help me\".  We discussed some of his options and he seemed slightly more hopeful that he could make some changes.  Chief Complaint   Patient presents with   • Suicidal Ideation     Pt was planning to hang himself tonight, previos suicide attempt slit his wrists a year ago        CURRENT LIVING SITUATION/SOCIAL SUPPORT: Homeless    BEHAVIORAL HEALTH TREATMENT HISTORY  Does patient/parent report a history of prior behavioral health treatment for patient?   No:    SAFETY ASSESSMENT - SELF  Does patient acknowledge current or past symptoms of dangerousness to self? yes  Does parent/significant other report patient has current or past symptoms of dangerousness to self? N\A  Does presenting problem suggest symptoms of dangerousness to self? Yes:     Past Current    Suicidal Thoughts: [x]  [x]    Suicidal Plans: [x]  [x]    Suicidal Intent: [x]  [x]    Suicide Attempts: [x]  []    Self-Injury [x]  []    For any boxes checked above, provide detail: Serious wrist cutting last year requiring surgery to wrists; planned to hang himself tonight.    History of suicide by family member: no  History of suicide by friend/significant other: no  Recent change in frequency/specificity/intensity of suicidal thoughts or self-harm behavior? no  Current access to firearms, medications, or other identified means " "of suicide/self-harm? no  If yes, willing to restrict access to means of suicide/self-harm? no  Protective factors present:  Willing to address in treatment    SAFETY ASSESSMENT - OTHERS  Does patient acknowledge current or past symptoms of aggressive behavior or risk to others? no  Does parent/significant other report patient has current or past symptoms of aggressive behavior or risk to others?  N\A  Does presenting problem suggest symptoms of dangerousness to others? No    Crisis Safety Plan completed and copy given to patient? no    ABUSE/NEGLECT SCREENING  Does patient report feeling “unsafe” in his/her home, or afraid of anyone?  no  Does patient report any history of physical, sexual, or emotional abuse?  no  Does parent or significant other report any of the above? N\A  Is there evidence of neglect by self?  no  Is there evidence of neglect by a caregiver? no  Does the patient/parent report any history of CPS/APS/police involvement related to suspected abuse/neglect or domestic violence? no  Based on the information provided during the current assessment, is a mandated report of suspected abuse/neglect being made?  No    SUBSTANCE USE SCREENING  Yes:  Titus all substances used in the past 30 days:      Last Use Amount   []   Alcohol     [x]   Marijuana daily 2 bowls   []   Heroin     []   Prescription Opioids  (used without prescription, for    recreation, or in excess of prescribed amount)     []   Other Prescription  (used without prescription, for    recreation, or in excess of prescribed amount)     []   Cocaine      [x]   Methamphetamine One month ago    []   \"\" drugs (ectasy, MDMA)     []   Other substances        UDS results: +cannabinoids  Breathalyzer results: 0.0    What consequences does the patient associate with any of the above substance use and or addictive behaviors? None    Risk factors for detox (check all that apply):  []  Seizures   []  Diaphoretic (sweating)   []  Tremors   []  " Hallucinations   []  Increased blood pressure   []  Decreased blood pressure   []  Other   []  None      [] Patient education on risk factors for detoxification and instructed to return to ER as needed.      MENTAL STATUS   Participation: Active verbal participation, Attentive and Engaged  Grooming: Casual and Neat  Orientation: Alert and Fully Oriented  Behavior: Tense  Eye contact: Good  Mood: Depressed and Anxious  Affect: Flexible, Full range, Congruent with content, Sad and Anxious  Thought process: Logical and Goal-directed  Thought content: Within normal limits  Speech: Rate within normal limits and Volume within normal limits  Perception: Within normal limits  Memory:  No gross evidence of memory deficits  Insight: Adequate  Judgment:  Adequate  Other:    Collateral information:   Source:  [] Significant other present in person:   [] Significant other by telephone  [x] [facility]   [x] [facility]  [x] [facility]  [] Other:     [] Unable to complete full assessment due to:  [] Acute intoxication  [] Patient declined to participate/engage  [] Patient verbally unresponsive  [] Significant cognitive deficits  [] Significant perceptual distortions or behavioral disorganization  [] Other:      CLINICAL IMPRESSIONS:  Primary:  R/O MDD  Secondary:  Alcohol and Methamphetamine Use D/O       IDENTIFIED NEEDS/PLAN:  [Trigger DISPOSITION list for any items marked]    [x]  Imminent safety risk - self [] Imminent safety risk - others   []  Acute substance withdrawal []  Psychosis/Impaired reality testing   [x]  Mood/anxiety []  Substance use/Addictive behavior   [x]  Maladaptive behaviro []  Parent/child conflict   []  Family/Couples conflict []  Biomedical   [x]  Housing [x]  Financial   []   Legal  Occupational/Educational   []  Domestic violence []  Other:     Disposition: Refer to [facility], [facility], [facility]  [facility]    Does patient express agreement with the above plan? yes    Referral appointment(s) scheduled? no    Alert team only:   I have discussed findings and recommendations with Dr. Rodriguez who is in agreement with these recommendations.     Referral information sent to the following community providers :    If applicable : Referred  to : [facility] for legal hold follow up at 0310      Sally Bedolla R.N.  6/21/2019

## 2019-06-21 NOTE — DISCHARGE PLANNING
Medical Social Work    LSW received call from [facility]/ [facility] ([facility]) stating the pt has been accepted by Dr. Pinon. LSW called [facility] and spoke w/ Mirna for transport authorization. PCS form and Facesheet faxed to [facility]. Transportation arranged w/ Marii @ [facility] for 0800. LSW completed transfer packet and placed original LH in packet and placed on pt's chart. Bedside RN and RB notified of 0800 transport time.

## 2019-06-21 NOTE — ED NOTES
RN at bedside to obtain vitals; Pt states he is feeling much better after some sleep. Pt denies any needs at this time.     1:1 sitter in place

## 2019-06-21 NOTE — ED PROVIDER NOTES
"ED Provider Note    CHIEF COMPLAINT  Chief Complaint   Patient presents with   • Suicidal Ideation     Pt was planning to hang himself tonight, previos suicide attempt slit his wrists a year ago       HPI  Herb Angulo is a 58 y.o. male here for evaluation of suicidal ideation.  Patient states that he was planning to hang himself tonight, and he does have a history of previous attempts in the past in which he has cut his wrist.  Patient denies any alcohol and denies any pill ingestion tonight.  He has no chest pain, no shortness breath, no fever, and no vomiting.  He has no abdominal pain, and no back pain.  He has no headache.  Patient states that \"I need some help.\"  He is here with his daughter.    PAST MEDICAL HISTORY   has a past medical history of Hypertension and Psychiatric disorder.    SOCIAL HISTORY  Social History     Social History Main Topics   • Smoking status: Never Smoker   • Smokeless tobacco: Never Used   • Alcohol use No   • Drug use: Yes     Types: Inhaled      Comment: daily marijuana use and meth in the past   • Sexual activity: No       SURGICAL HISTORY   has a past surgical history that includes other orthopedic surgery and irrigation & debridement general (Left, 8/28/2017).    CURRENT MEDICATIONS  Home Medications    **Home medications have not yet been reviewed for this encounter**         ALLERGIES  No Known Allergies    REVIEW OF SYSTEMS  See HPI for further details. Review of systems as above, otherwise all other systems are negative.     PHYSICAL EXAM  VITAL SIGNS: /105   Pulse 89   Temp 36.7 °C (98 °F) (Temporal)   Resp 17   Ht 1.854 m (6' 1\")   Wt 109.8 kg (242 lb)   SpO2 97%   BMI 31.93 kg/m²     Constitutional: Well developed, well nourished.  Mild acute distress.  HEENT: Normocephalic, atraumatic. MMM  Neck: Supple, Full range of motion   Chest/Pulmonary:  No respiratory distress.  Equal expansion   Musculoskeletal: No deformity, no edema, neurovascular intact. "   Neuro: Clear speech, appropriate, cooperative, cranial nerves II-XII grossly intact.  Psych: Anxious, agitated      PROCEDURES     MEDICAL RECORD  I have reviewed patient's medical record and pertinent results are listed above.    COURSE & MEDICAL DECISION MAKING  I have reviewed any medical record information, laboratory studies and radiographic results as noted above.    2:02 AM  The patient will need to be seen by life skills and will likely be transferred to a psych facility.  He denies taking any pills, denies any himself, just reports having those thoughts and plans to do so. All legal paperwork completed.       4:03 AM  The pt has been seen by [facility], and will remain here until seen by psych in the am.      FINAL IMPRESSION  Suicidal ideation      Electronically signed by: Alverto Rodriguez, 6/21/2019 1:48 AM

## 2019-06-21 NOTE — ED NOTES
Pt asleep on gurney; respirations are equal and non labored. RN will continue to monitor.    1:1 sitter in place

## 2021-03-15 DIAGNOSIS — Z23 NEED FOR VACCINATION: ICD-10-CM

## 2025-03-02 NOTE — ED NOTES
Pt awake in bed talking with providor. Aox4, resp equal unlabored. Pt cooperative and calm. Sitter in place.   02-Mar-2025 12:46

## (undated) DEVICE — SET EXTENSION WITH 2 PORTS (48EA/CA) ***PART #2C8610 IS A SUBSTITUTE*****

## (undated) DEVICE — GOWN WARMING STANDARD FLEX - (30/CA)

## (undated) DEVICE — MASK ANESTHESIA ADULT  - (100/CA)

## (undated) DEVICE — MASK, LARYNGEAL AIRWAY #4

## (undated) DEVICE — NEPTUNE 4 PORT MANIFOLD - (20/PK)

## (undated) DEVICE — SPONGE GAUZESTER. 2X2 4-PL - (2/PK 50PK/BX 30BX/CS)

## (undated) DEVICE — KIT ROOM DECONTAMINATION

## (undated) DEVICE — ELECTRODE DUAL RETURN W/ CORD - (50/PK)

## (undated) DEVICE — PACK MINOR BASIN - (2EA/CA)

## (undated) DEVICE — CANISTER SUCTION 3000ML MECHANICAL FILTER AUTO SHUTOFF MEDI-VAC NONSTERILE LF DISP  (40EA/CA)

## (undated) DEVICE — HEAD HOLDER JUNIOR/ADULT

## (undated) DEVICE — LACTATED RINGERS INJ 1000 ML - (14EA/CA 60CA/PF)

## (undated) DEVICE — TUBING CLEARLINK DUO-VENT - C-FLO (48EA/CA)

## (undated) DEVICE — SUCTION INSTRUMENT YANKAUER BULBOUS TIP W/O VENT (50EA/CA)

## (undated) DEVICE — SENSOR SPO2 NEO LNCS ADHESIVE (20/BX) SEE USER NOTES

## (undated) DEVICE — SLEEVE, VASO, THIGH, MED

## (undated) DEVICE — STAPLER SKIN DISP - (6/BX 10BX/CA) VISISTAT

## (undated) DEVICE — TRAY SKIN SCRUB PVP WET (20EA/CA) PART #DYND70356 DISCONTINUED

## (undated) DEVICE — SODIUM CHL IRRIGATION 0.9% 1000ML (12EA/CA)

## (undated) DEVICE — DRAPE LAPAROTOMY T SHEET - (12EA/CA)

## (undated) DEVICE — BLADE SURGICAL #15 - (50/BX 3BX/CA)

## (undated) DEVICE — SET LEADWIRE 5 LEAD BEDSIDE DISPOSABLE ECG (1SET OF 5/EA)

## (undated) DEVICE — ELECTRODE 850 FOAM ADHESIVE - HYDROGEL RADIOTRNSPRNT (50/PK)

## (undated) DEVICE — GLOVE BIOGEL SZ 6.5 SURGICAL PF LTX (50PR/BX 4BX/CA)

## (undated) DEVICE — KIT ANESTHESIA W/CIRCUIT & 3/LT BAG W/FILTER (20EA/CA)

## (undated) DEVICE — PROTECTOR ULNA NERVE - (36PR/CA)

## (undated) DEVICE — GLOVE BIOGEL INDICATOR SZ 6.5 SURGICAL PF LTX - (50PR/BX 4BX/CA)